# Patient Record
Sex: MALE | Race: WHITE | NOT HISPANIC OR LATINO | Employment: FULL TIME | URBAN - METROPOLITAN AREA
[De-identification: names, ages, dates, MRNs, and addresses within clinical notes are randomized per-mention and may not be internally consistent; named-entity substitution may affect disease eponyms.]

---

## 2023-08-07 DIAGNOSIS — I10 ESSENTIAL HYPERTENSION: ICD-10-CM

## 2023-08-07 DIAGNOSIS — E78.2 MIXED HYPERLIPIDEMIA: ICD-10-CM

## 2023-08-07 RX ORDER — ATENOLOL 50 MG/1
TABLET ORAL
Qty: 60 TABLET | Refills: 8 | OUTPATIENT
Start: 2023-08-07

## 2023-08-07 RX ORDER — ROSUVASTATIN CALCIUM 10 MG/1
TABLET, COATED ORAL
Qty: 30 TABLET | Refills: 8 | OUTPATIENT
Start: 2023-08-07

## 2023-08-25 DIAGNOSIS — I10 UNCONTROLLED HYPERTENSION: ICD-10-CM

## 2023-08-25 DIAGNOSIS — E78.2 MIXED HYPERLIPIDEMIA: ICD-10-CM

## 2023-08-25 DIAGNOSIS — I10 ESSENTIAL HYPERTENSION: ICD-10-CM

## 2023-08-28 RX ORDER — LOSARTAN POTASSIUM 50 MG/1
50 TABLET ORAL DAILY
Qty: 30 TABLET | Refills: 5 | OUTPATIENT
Start: 2023-08-28 | End: 2023-09-27

## 2023-08-28 RX ORDER — ATENOLOL 50 MG/1
TABLET ORAL
Qty: 60 TABLET | Refills: 8 | OUTPATIENT
Start: 2023-08-28

## 2023-08-28 RX ORDER — ROSUVASTATIN CALCIUM 10 MG/1
TABLET, COATED ORAL
Qty: 30 TABLET | Refills: 8 | OUTPATIENT
Start: 2023-08-28

## 2023-09-01 DIAGNOSIS — E78.2 MIXED HYPERLIPIDEMIA: ICD-10-CM

## 2023-09-01 RX ORDER — ROSUVASTATIN CALCIUM 10 MG/1
TABLET, COATED ORAL
Qty: 30 TABLET | Refills: 8 | OUTPATIENT
Start: 2023-09-01

## 2023-09-14 ENCOUNTER — OFFICE VISIT (OUTPATIENT)
Dept: FAMILY MEDICINE CLINIC | Facility: CLINIC | Age: 48
End: 2023-09-14
Payer: COMMERCIAL

## 2023-09-14 VITALS
DIASTOLIC BLOOD PRESSURE: 104 MMHG | RESPIRATION RATE: 20 BRPM | HEART RATE: 89 BPM | SYSTOLIC BLOOD PRESSURE: 180 MMHG | BODY MASS INDEX: 32.1 KG/M2 | HEIGHT: 72 IN | WEIGHT: 237 LBS | OXYGEN SATURATION: 97 %

## 2023-09-14 DIAGNOSIS — F41.9 ANXIETY: ICD-10-CM

## 2023-09-14 DIAGNOSIS — Z72.0 TOBACCO ABUSE: ICD-10-CM

## 2023-09-14 DIAGNOSIS — I10 ESSENTIAL HYPERTENSION: ICD-10-CM

## 2023-09-14 DIAGNOSIS — E55.9 VITAMIN D DEFICIENCY: ICD-10-CM

## 2023-09-14 DIAGNOSIS — Z11.4 SCREENING FOR HIV (HUMAN IMMUNODEFICIENCY VIRUS): ICD-10-CM

## 2023-09-14 DIAGNOSIS — E78.2 MIXED HYPERLIPIDEMIA: ICD-10-CM

## 2023-09-14 DIAGNOSIS — G43.009 MIGRAINE WITHOUT AURA AND WITHOUT STATUS MIGRAINOSUS, NOT INTRACTABLE: ICD-10-CM

## 2023-09-14 DIAGNOSIS — N18.30 STAGE 3 CHRONIC KIDNEY DISEASE, UNSPECIFIED WHETHER STAGE 3A OR 3B CKD (HCC): ICD-10-CM

## 2023-09-14 DIAGNOSIS — R94.31 ABNORMAL EKG: ICD-10-CM

## 2023-09-14 DIAGNOSIS — Z12.5 SCREENING FOR PROSTATE CANCER: ICD-10-CM

## 2023-09-14 DIAGNOSIS — F33.9 DEPRESSION, RECURRENT (HCC): ICD-10-CM

## 2023-09-14 DIAGNOSIS — Z11.59 NEED FOR HEPATITIS C SCREENING TEST: ICD-10-CM

## 2023-09-14 DIAGNOSIS — Z00.00 HEALTHCARE MAINTENANCE: ICD-10-CM

## 2023-09-14 DIAGNOSIS — I10 PRIMARY HYPERTENSION: Primary | ICD-10-CM

## 2023-09-14 DIAGNOSIS — R73.9 HYPERGLYCEMIA: ICD-10-CM

## 2023-09-14 DIAGNOSIS — I10 UNCONTROLLED HYPERTENSION: ICD-10-CM

## 2023-09-14 DIAGNOSIS — Z12.11 SCREENING FOR COLON CANCER: ICD-10-CM

## 2023-09-14 DIAGNOSIS — M10.9 GOUTY ARTHRITIS: ICD-10-CM

## 2023-09-14 PROBLEM — S59.902A INJURY OF ELBOW, LEFT: Status: RESOLVED | Noted: 2017-03-13 | Resolved: 2023-09-14

## 2023-09-14 PROBLEM — Z87.39 HX OF GOUT: Status: RESOLVED | Noted: 2020-09-24 | Resolved: 2023-09-14

## 2023-09-14 PROBLEM — F17.200 TOBACCO DEPENDENCE SYNDROME: Status: RESOLVED | Noted: 2018-07-11 | Resolved: 2023-09-14

## 2023-09-14 PROBLEM — S46.312A RUPTURE OF LEFT TRICEPS TENDON: Status: RESOLVED | Noted: 2018-07-11 | Resolved: 2023-09-14

## 2023-09-14 PROCEDURE — 99215 OFFICE O/P EST HI 40 MIN: CPT | Performed by: FAMILY MEDICINE

## 2023-09-14 PROCEDURE — 93000 ELECTROCARDIOGRAM COMPLETE: CPT | Performed by: FAMILY MEDICINE

## 2023-09-14 RX ORDER — SUMATRIPTAN 50 MG/1
TABLET, FILM COATED ORAL
Qty: 9 TABLET | Refills: 0 | Status: SHIPPED | OUTPATIENT
Start: 2023-09-14

## 2023-09-14 RX ORDER — AMLODIPINE BESYLATE 2.5 MG/1
2.5 TABLET ORAL DAILY
Qty: 30 TABLET | Refills: 5 | Status: SHIPPED | OUTPATIENT
Start: 2023-09-14

## 2023-09-14 RX ORDER — ATENOLOL 50 MG/1
50 TABLET ORAL 2 TIMES DAILY
Qty: 60 TABLET | Refills: 5 | Status: SHIPPED | OUTPATIENT
Start: 2023-09-14

## 2023-09-14 RX ORDER — LOSARTAN POTASSIUM 50 MG/1
50 TABLET ORAL DAILY
Qty: 30 TABLET | Refills: 5 | Status: SHIPPED | OUTPATIENT
Start: 2023-09-14 | End: 2023-10-14

## 2023-09-14 NOTE — PATIENT INSTRUCTIONS
Restart losartan, atenolol, and amlodipine  Use Imitrex immediately for headache may repeat in 2 hours no more than 2 tablets in 24 hours  Complete blood work as ordered  Complete stress echocardiogram  Recheck 2 weeks sooner if needed

## 2023-09-14 NOTE — ASSESSMENT & PLAN NOTE
Lab Results   Component Value Date    EGFR 50 02/16/2022    CREATININE 1.59 (H) 02/16/2022    CREATININE 0.93 06/05/2015   ARB therapy, blood work ordered

## 2023-09-14 NOTE — ASSESSMENT & PLAN NOTE
Uncontrolled patient has not taken his blood pressure medication a few weeks will restart medication check 2 weeks

## 2023-09-14 NOTE — PROGRESS NOTES
Name: Jhonny Haynes      : 1975      MRN: [id number]  Encounter Provider: Mikael Barlow DO  Encounter Date: 2023   Encounter department: [place] [address] [place] [facility]     1. Primary hypertension #2  uncontrolled hypertension  Medications renewed  EKG was completed  Blood work is ordered to include catecholamine  Of note patient did have renal artery Doppler which was normal 2022  3. CKD-3, blood work is ordered  4. For anxiety  5. MDD  Continue Lexapro  6. BMI 32.14, inaccurate diagnosis patient much more muscular than average male height weight and age  9. Former smoker, patient has quit as of , 15-pack-year history  8. Vitamin D deficiency, blood work ordered  5. Healthcare maintenance screening for hepatitis C and HIV discussed order placed  10. Screening colon cancer risk and benefit of colonoscopy versus Cologuard discussed. Patient chose Cologuard, ordered #11. Screening prostate cancer PSA is ordered  12. Gouty arthritis, asymptomatic uric acid is ordered  13. Abnormal EKG  Lateral ischemia versus LVH with strain  Patient did have a negative stress echo   Recheck stress echocardiogram  14. Recheck 2 weeks sooner if needed  15. Will have nursing check blood pressure in 1 week      1. Primary hypertension  Assessment & Plan:  Uncontrolled patient has not taken his blood pressure medication a few weeks will restart medication check 2 weeks    Orders:  -     POCT ECG  -     CBC  -     Comprehensive metabolic panel  -     Hemoglobin A1C; Future  -     Lipid Panel with Direct LDL reflex  -     TSH, 3rd generation with Free T4 reflex  -     PSA, Total Screen; Future  -     UA (URINE) with reflex to Scope  -     Vitamin D 25 hydroxy; Future  -     Catecholamines, fractionated, plasma; Future    2.  Stage 3 chronic kidney disease, unspecified whether stage 3a or 3b CKD ([address] [facility])  Assessment & Plan:  Lab Results   Component Value Date    EGFR 50 02/16/2022    CREATININE 1.59 (H) 02/16/2022    CREATININE 0.93 06/05/2015   ARB therapy, blood work ordered    Orders:  -     CBC  -     Comprehensive metabolic panel  -     Hemoglobin A1C; Future  -     Lipid Panel with Direct LDL reflex  -     TSH, 3rd generation with Free T4 reflex  -     PSA, Total Screen; Future  -     UA (URINE) with reflex to Scope  -     Vitamin D 25 hydroxy; Future  -     Catecholamines, fractionated, plasma; Future    3. Anxiety  Assessment & Plan:  Able continue Lexapro    Orders:  -     CBC  -     Comprehensive metabolic panel  -     Hemoglobin A1C; Future  -     Lipid Panel with Direct LDL reflex  -     TSH, 3rd generation with Free T4 reflex  -     PSA, Total Screen; Future  -     UA (URINE) with reflex to Scope  -     Vitamin D 25 hydroxy; Future  -     Catecholamines, fractionated, plasma; Future    4. Depression, recurrent ([address])  Assessment & Plan:  Stable continue Lexapro    Orders:  -     CBC  -     Comprehensive metabolic panel  -     Hemoglobin A1C; Future  -     Lipid Panel with Direct LDL reflex  -     TSH, 3rd generation with Free T4 reflex  -     PSA, Total Screen; Future  -     UA (URINE) with reflex to Scope  -     Vitamin D 25 hydroxy; Future  -     Catecholamines, fractionated, plasma; Future    5. Mixed hyperlipidemia  Assessment & Plan:  Blood work ordered    Orders:  -     CBC  -     Comprehensive metabolic panel  -     Hemoglobin A1C; Future  -     Lipid Panel with Direct LDL reflex  -     TSH, 3rd generation with Free T4 reflex  -     PSA, Total Screen; Future  -     UA (URINE) with reflex to Scope  -     Vitamin D 25 hydroxy; Future  -     Catecholamines, fractionated, plasma; Future    6. Tobacco abuse  Assessment & Plan:  15-pack-year history quit 4/21    Orders:  -     CBC  -     Comprehensive metabolic panel  -     Hemoglobin A1C; Future  -     Lipid Panel with Direct LDL reflex  -     TSH, 3rd generation with Free T4 reflex  -     PSA, Total Screen;  Future  - UA (URINE) with reflex to Scope  -     Vitamin D 25 hydroxy; Future  -     Catecholamines, fractionated, plasma; Future    7. Vitamin D deficiency  Assessment & Plan:  Blood work ordered    Orders:  -     CBC  -     Comprehensive metabolic panel  -     Hemoglobin A1C; Future  -     Lipid Panel with Direct LDL reflex  -     TSH, 3rd generation with Free T4 reflex  -     PSA, Total Screen; Future  -     UA (URINE) with reflex to Scope  -     Vitamin D 25 hydroxy; Future  -     Catecholamines, fractionated, plasma; Future    8. Healthcare maintenance  Assessment & Plan:  Screening for HIV and hepatitis C discussed order placed    Orders:  -     CBC  -     Comprehensive metabolic panel  -     Hemoglobin A1C; Future  -     Lipid Panel with Direct LDL reflex  -     TSH, 3rd generation with Free T4 reflex  -     PSA, Total Screen; Future  -     UA (URINE) with reflex to Scope  -     Vitamin D 25 hydroxy; Future  -     Catecholamines, fractionated, plasma; Future    9. Screening for colon cancer  Assessment & Plan:  Cologuard ordered    Orders:  -     Cologuard  -     CBC  -     Comprehensive metabolic panel  -     Hemoglobin A1C; Future  -     Lipid Panel with Direct LDL reflex  -     TSH, 3rd generation with Free T4 reflex  -     PSA, Total Screen; Future  -     UA (URINE) with reflex to Scope  -     Vitamin D 25 hydroxy; Future  -     Catecholamines, fractionated, plasma; Future    10. Screening for prostate cancer  Assessment & Plan:  PSA ordered    Orders:  -     CBC  -     Comprehensive metabolic panel  -     Hemoglobin A1C; Future  -     Lipid Panel with Direct LDL reflex  -     TSH, 3rd generation with Free T4 reflex  -     PSA, Total Screen; Future  -     UA (URINE) with reflex to Scope  -     Vitamin D 25 hydroxy; Future  -     Catecholamines, fractionated, plasma; Future    11.  Hyperglycemia  Assessment & Plan:  Blood work ordered    Orders:  -     CBC  -     Comprehensive metabolic panel  -     Hemoglobin A1C; Future  -     Lipid Panel with Direct LDL reflex  -     TSH, 3rd generation with Free T4 reflex  -     PSA, Total Screen; Future  -     UA (URINE) with reflex to Scope  -     Vitamin D 25 hydroxy; Future  -     Catecholamines, fractionated, plasma; Future    12. Abnormal EKG  Assessment & Plan:  Stress echo ordered LVH with strain versus ischemia    Orders:  -     Echo stress test, exercise; Future; Expected date: 09/14/2023  -     CBC  -     Comprehensive metabolic panel  -     Hemoglobin A1C; Future  -     Lipid Panel with Direct LDL reflex  -     TSH, 3rd generation with Free T4 reflex  -     PSA, Total Screen; Future  -     UA (URINE) with reflex to Scope  -     Vitamin D 25 hydroxy; Future  -     Catecholamines, fractionated, plasma; Future    13. Gouty arthritis  -     CBC  -     Comprehensive metabolic panel  -     Hemoglobin A1C; Future  -     Lipid Panel with Direct LDL reflex  -     TSH, 3rd generation with Free T4 reflex  -     PSA, Total Screen; Future  -     UA (URINE) with reflex to Scope  -     Vitamin D 25 hydroxy; Future  -     Catecholamines, fractionated, plasma; Future  -     Uric acid    14. Migraine without aura and without status migrainosus, not intractable  Assessment & Plan:  Imitrex ordered    Orders:  -     SUMAtriptan (Imitrex) 50 mg tablet; 1 tablet stat for headache. May repeat 1 tablet in 2 hours. No more than 2 to 24 hours  -     CBC  -     Comprehensive metabolic panel  -     Hemoglobin A1C; Future  -     Lipid Panel with Direct LDL reflex  -     TSH, 3rd generation with Free T4 reflex  -     PSA, Total Screen; Future  -     UA (URINE) with reflex to Scope  -     Vitamin D 25 hydroxy; Future  -     Catecholamines, fractionated, plasma; Future    15. Uncontrolled hypertension  -     losartan (COZAAR) 50 mg tablet;  Take 1 tablet (50 mg total) by mouth daily  -     CBC  -     Comprehensive metabolic panel  -     Hemoglobin A1C; Future  -     Lipid Panel with Direct LDL reflex  - TSH, 3rd generation with Free T4 reflex  -     PSA, Total Screen; Future  -     UA (URINE) with reflex to Scope  -     Vitamin D 25 hydroxy; Future  -     Catecholamines, fractionated, plasma; Future    16. Essential hypertension  -     atenolol (TENORMIN) 50 mg tablet; Take 1 tablet (50 mg total) by mouth 2 (two) times a day  -     amLODIPine (NORVASC) 2.5 mg tablet; Take 1 tablet (2.5 mg total) by mouth daily  -     CBC  -     Comprehensive metabolic panel  -     Hemoglobin A1C; Future  -     Lipid Panel with Direct LDL reflex  -     TSH, 3rd generation with Free T4 reflex  -     PSA, Total Screen; Future  -     UA (URINE) with reflex to Scope  -     Vitamin D 25 hydroxy; Future  -     Catecholamines, fractionated, plasma; Future    17. Need for hepatitis C screening test  -     Hepatitis C Antibody; Future  -     CBC  -     Comprehensive metabolic panel  -     Hemoglobin A1C; Future  -     Lipid Panel with Direct LDL reflex  -     TSH, 3rd generation with Free T4 reflex  -     PSA, Total Screen; Future  -     UA (URINE) with reflex to Scope  -     Vitamin D 25 hydroxy; Future  -     Catecholamines, fractionated, plasma; Future    18. Screening for HIV (human immunodeficiency virus)  -     HIV 1/2 AG/AB w Reflex [facility] for 2 yr old and above; Future  -     CBC  -     Comprehensive metabolic panel  -     Hemoglobin A1C; Future  -     Lipid Panel with Direct LDL reflex  -     TSH, 3rd generation with Free T4 reflex  -     PSA, Total Screen; Future  -     UA (URINE) with reflex to Scope  -     Vitamin D 25 hydroxy; Future  -     Catecholamines, fractionated, plasma; Future      BMI Counseling: Body mass index is 32.14 kg/m². The BMI is above normal. Nutrition recommendations include moderation in carbohydrate intake and reducing intake of cholesterol. Exercise recommendations include exercising 3-5 times per week. Rationale for BMI follow-up plan is due to patient being overweight or obese.          Subjective Has not seen a doctor in almost a year. He has run out of medications for his blood pressure couple weeks ago. Denies chest pain shortness of breath but is having headaches migraine type. Has been going on for many years with photo and phonophobia. Negative vertigo negative syncope    Review of Systems   Constitutional: Negative. HENT: Negative. Eyes: Negative. Respiratory:        HPI   Cardiovascular:        HPI   Gastrointestinal:        Patient did not have any colon cancer screening as of yet   Endocrine: Negative. Genitourinary: Negative. Musculoskeletal: Negative. Skin: Negative. Allergic/Immunologic: Negative. Neurological:        HPI   Hematological: Negative. Psychiatric/Behavioral: Negative. Current Outpatient Medications on File Prior to Visit   Medication Sig   • ALPRAZolam (XANAX) 0.5 mg tablet Take 1 tablet (0.5 mg total) by mouth 2 (two) times a day as needed for anxiety   • atorvastatin (LIPITOR) 40 mg tablet Take 40 mg by mouth in the morning. • escitalopram (LEXAPRO) 20 mg tablet Take 20 mg by mouth in the morning. • finasteride (PROSCAR) 5 mg tablet take 1 tablet by mouth once daily   • rosuvastatin (CRESTOR) 10 MG tablet TAKE 1 TABLET BY MOUTH EVERY DAY   • zolpidem (AMBIEN) 10 mg tablet Take 1 tablet (10 mg total) by mouth daily at bedtime as needed for sleep   • [DISCONTINUED] amLODIPine (NORVASC) 2.5 mg tablet take 1 tablet by mouth once daily   • [DISCONTINUED] atenolol (TENORMIN) 50 mg tablet TAKE 1 TABLET BY MOUTH TWICE A DAY   • [DISCONTINUED] losartan (COZAAR) 50 mg tablet TAKE 1 TABLET (50 MG TOTAL) BY MOUTH IN THE MORNING. • [DISCONTINUED] Acetaminophen Extra Strength 500 MG tablet TAKE 2 TABLETS BY MOUTH 3 TIMES A DAY FOR 7 DAYS.  THEN AS NEEDED (Patient not taking: No sig reported)   • [DISCONTINUED] celecoxib (CeleBREX) 200 mg capsule Take 200 mg by mouth 2 (two) times a day (Patient not taking: Reported on 5/17/2022)   • [DISCONTINUED] ergocalciferol (VITAMIN D2) 50,000 units Take 1 capsule (50,000 Units total) by mouth once a week (Patient not taking: Reported on 9/14/2023)   • [DISCONTINUED] methocarbamol (ROBAXIN) 750 mg tablet TAKE 1 TABLET BY MOUTH 4 TIMES A DAY AS NEEDED FOR MUSCLE SPASMS (Patient not taking: No sig reported)   • [DISCONTINUED] nicotine (NICODERM CQ) 14 mg/24hr TD 24 hr patch Place 1 patch on the skin every 24 hours Patient was start after 21 mg patches are finished (Patient not taking: Reported on 9/14/2023)   • [DISCONTINUED] nicotine (NICODERM CQ) 21 mg/24 hr TD 24 hr patch Place 1 patch on the skin every 24 hours (Patient not taking: Reported on 9/14/2023)   • [DISCONTINUED] nicotine (NICODERM CQ) 7 mg/24hr TD 24 hr patch Place 1 patch on the skin every 24 hours Start after 14 mg patches of finished (Patient not taking: Reported on 9/14/2023)   • [DISCONTINUED] Sod Fluoride-Potassium Nitrate (PreviDent 5000 Sensitive) 1.1-5 % PSTE PreviDent 5000 Sensitive 1.1 %-5 % dental paste   BRUSH ONCE DAILY BEFORE BED (Patient not taking: No sig reported)   • [DISCONTINUED] valACYclovir (VALTREX) 500 mg tablet Take 1 tablet (500 mg total) by mouth 2 (two) times a day for 5 days (Patient not taking: Reported on 9/14/2023)       Objective     BP (!) 180/104   Pulse 89   Resp 20   Ht 6' (1.829 m)   Wt 108 kg (237 lb)   SpO2 97%   BMI 32.14 kg/m²     Physical Exam  Vitals and nursing note reviewed. Constitutional:       General: He is not in acute distress. Appearance: Normal appearance. He is not ill-appearing, toxic-appearing or diaphoretic. Comments: Very muscular 17-year-old white male in no acute distress   HENT:      Head: Normocephalic and atraumatic. Right Ear: Tympanic membrane normal.      Left Ear: Tympanic membrane normal.      Nose: Nose normal.      Mouth/Throat:      Mouth: Mucous membranes are moist.      Pharynx: Oropharynx is clear. No oropharyngeal exudate or posterior oropharyngeal erythema. Eyes:      General: No scleral icterus. Right eye: No discharge. Left eye: No discharge. Extraocular Movements: Extraocular movements intact. Conjunctiva/sclera: Conjunctivae normal.      Pupils: Pupils are equal, round, and reactive to light. Neck:      Vascular: No carotid bruit. Cardiovascular:      Rate and Rhythm: Normal rate and regular rhythm. Heart sounds: Normal heart sounds. Pulmonary:      Effort: Pulmonary effort is normal.      Breath sounds: Normal breath sounds. Abdominal:      General: Bowel sounds are normal.      Palpations: Abdomen is soft. Tenderness: There is no abdominal tenderness. Musculoskeletal:      Cervical back: Neck supple. Right lower leg: No edema. Left lower leg: No edema. Skin:     General: Skin is warm and dry. Neurological:      General: No focal deficit present. Mental Status: He is alert and oriented to person, place, and time. Cranial Nerves: No cranial nerve deficit.       Gait: Gait normal.   Psychiatric:         Mood and Affect: Mood normal.       Gabriel Hanley DO

## 2023-09-20 DIAGNOSIS — G47.00 INSOMNIA, UNSPECIFIED TYPE: ICD-10-CM

## 2023-09-20 NOTE — TELEPHONE ENCOUNTER
Adrien, this is Sohan Caballero 5th calling and I'm calling. In regards to my fiance Williams Pill Date of birth is [birth year]. We were just in to see Doctor Bonnie Whitfield last week and he prescribed some medication and we did ask if he could prescribe Ambien and that was not sent over to [facility] on [facility]. Is there any way that you could again fill the prescription Ambien and send it over to [facility]? Please call me back at [phone] if you need any more information or have any questions. Thank you.

## 2023-09-21 RX ORDER — ZOLPIDEM TARTRATE 10 MG/1
10 TABLET ORAL
Qty: 30 TABLET | Refills: 2 | Status: SHIPPED | OUTPATIENT
Start: 2023-09-21

## 2023-10-05 ENCOUNTER — RA CDI HCC (OUTPATIENT)
Dept: OTHER | Facility: HOSPITAL | Age: 48
End: 2023-10-05

## 2023-10-05 NOTE — PROGRESS NOTES
[facility] [address] [facility] coding opportunities       Chart reviewed, no opportunity found: CHART REVIEWED, NO OPPORTUNITY FOUND        Patients Insurance        Commercial Insurance: Saeid Campbell

## 2023-10-12 ENCOUNTER — TELEPHONE (OUTPATIENT)
Dept: FAMILY MEDICINE CLINIC | Facility: CLINIC | Age: 48
End: 2023-10-12

## 2023-10-12 DIAGNOSIS — F33.9 DEPRESSION, RECURRENT (HCC): ICD-10-CM

## 2023-10-12 DIAGNOSIS — F41.9 ANXIETY: Primary | ICD-10-CM

## 2023-10-12 RX ORDER — ESCITALOPRAM OXALATE 20 MG/1
20 TABLET ORAL DAILY
Qty: 30 TABLET | Refills: 3 | Status: SHIPPED | OUTPATIENT
Start: 2023-10-12

## 2023-10-12 NOTE — TELEPHONE ENCOUNTER
Medication: lexapro 20 mg  Day supply:30  Pharmacy: [facility] [address]     Last office visit: 9/20/2023    Upcoming office visit: 11/16/2023

## 2023-11-05 LAB
25(OH)D3 SERPL-MCNC: 29 NG/ML (ref 30–100)
ALBUMIN SERPL-MCNC: 4.4 G/DL (ref 3.6–5.1)
ALBUMIN/GLOB SERPL: 1.4 (CALC) (ref 1–2.5)
ALP SERPL-CCNC: 143 U/L (ref 35–144)
ALT SERPL-CCNC: 28 U/L (ref 9–46)
APPEARANCE UR: CLEAR
AST SERPL-CCNC: 32 U/L (ref 10–35)
BACTERIA UR QL AUTO: ABNORMAL /HPF
BASOPHILS # BLD AUTO: 28 CELLS/UL (ref 0–200)
BASOPHILS NFR BLD AUTO: 0.4 %
BILIRUB SERPL-MCNC: 0.4 MG/DL (ref 0.2–1.2)
BILIRUB UR QL STRIP: NEGATIVE
BUN SERPL-MCNC: 17 MG/DL (ref 7–25)
BUN/CREAT SERPL: ABNORMAL (CALC) (ref 6–22)
CALCIUM SERPL-MCNC: 9.5 MG/DL (ref 8.6–10.3)
CHLORIDE SERPL-SCNC: 104 MMOL/L (ref 98–110)
CHOLEST SERPL-MCNC: 242 MG/DL
CHOLEST/HDLC SERPL: 4.7 (CALC)
CO2 SERPL-SCNC: 28 MMOL/L (ref 20–32)
COLOR UR: YELLOW
CREAT SERPL-MCNC: 1.07 MG/DL (ref 0.7–1.3)
EOSINOPHIL # BLD AUTO: 133 CELLS/UL (ref 15–500)
EOSINOPHIL NFR BLD AUTO: 1.9 %
ERYTHROCYTE [DISTWIDTH] IN BLOOD BY AUTOMATED COUNT: 13.2 % (ref 11–15)
GFR/BSA.PRED SERPLBLD CYS-BASED-ARV: 85 ML/MIN/1.73M2
GLOBULIN SER CALC-MCNC: 3.1 G/DL (CALC) (ref 1.9–3.7)
GLUCOSE SERPL-MCNC: 108 MG/DL (ref 65–99)
GLUCOSE UR QL STRIP: NEGATIVE
HBA1C MFR BLD: 5.5 % OF TOTAL HGB
HCT VFR BLD AUTO: 42.6 % (ref 38.5–50)
HCV AB SERPL QL IA: NORMAL
HDLC SERPL-MCNC: 52 MG/DL
HGB BLD-MCNC: 14.4 G/DL (ref 13.2–17.1)
HGB UR QL STRIP: NEGATIVE
HIV 1+2 AB+HIV1 P24 AG SERPL QL IA: NORMAL
HYALINE CASTS #/AREA URNS LPF: ABNORMAL /LPF
KETONES UR QL STRIP: NEGATIVE
LDLC SERPL CALC-MCNC: 153 MG/DL (CALC)
LEUKOCYTE ESTERASE UR QL STRIP: NEGATIVE
LYMPHOCYTES # BLD AUTO: 2163 CELLS/UL (ref 850–3900)
LYMPHOCYTES NFR BLD AUTO: 30.9 %
MCH RBC QN AUTO: 28.9 PG (ref 27–33)
MCHC RBC AUTO-ENTMCNC: 33.8 G/DL (ref 32–36)
MCV RBC AUTO: 85.4 FL (ref 80–100)
MONOCYTES # BLD AUTO: 525 CELLS/UL (ref 200–950)
MONOCYTES NFR BLD AUTO: 7.5 %
NEUTROPHILS # BLD AUTO: 4151 CELLS/UL (ref 1500–7800)
NEUTROPHILS NFR BLD AUTO: 59.3 %
NITRITE UR QL STRIP: NEGATIVE
NONHDLC SERPL-MCNC: 190 MG/DL (CALC)
PH UR STRIP: 6.5 [PH] (ref 5–8)
PLATELET # BLD AUTO: 302 THOUSAND/UL (ref 140–400)
PMV BLD REES-ECKER: 10 FL (ref 7.5–12.5)
POTASSIUM SERPL-SCNC: 4.5 MMOL/L (ref 3.5–5.3)
PROT SERPL-MCNC: 7.5 G/DL (ref 6.1–8.1)
PROT UR QL STRIP: ABNORMAL
PSA SERPL-MCNC: 0.31 NG/ML
RBC # BLD AUTO: 4.99 MILLION/UL (ref 4.2–5.8)
RBC #/AREA URNS HPF: ABNORMAL /HPF
SODIUM SERPL-SCNC: 140 MMOL/L (ref 135–146)
SP GR UR STRIP: 1.02 (ref 1–1.03)
SQUAMOUS #/AREA URNS HPF: ABNORMAL /HPF
TRIGL SERPL-MCNC: 207 MG/DL
TSH SERPL-ACNC: 1.12 MIU/L (ref 0.4–4.5)
URATE SERPL-MCNC: 7.9 MG/DL (ref 4–8)
WBC # BLD AUTO: 7 THOUSAND/UL (ref 3.8–10.8)
WBC #/AREA URNS HPF: ABNORMAL /HPF

## 2023-11-06 ENCOUNTER — HOSPITAL ENCOUNTER (OUTPATIENT)
Dept: NON INVASIVE DIAGNOSTICS | Facility: HOSPITAL | Age: 48
Discharge: HOME | End: 2023-11-06
Payer: COMMERCIAL

## 2023-11-06 VITALS — WEIGHT: 237 LBS | HEIGHT: 72 IN | BODY MASS INDEX: 32.1 KG/M2

## 2023-11-06 DIAGNOSIS — R94.31 ABNORMAL EKG: ICD-10-CM

## 2023-11-06 LAB
AORTIC ROOT: 3.4 CM
AORTIC VALVE MEAN VELOCITY: 12.2 M/S
APICAL FOUR CHAMBER EJECTION FRACTION: 69 %
ASCENDING AORTA: 4.6 CM
AV AREA BY CONTINUOUS VTI: 3.4 CM2
AV AREA PEAK VELOCITY: 3.4 CM2
AV LVOT MEAN GRADIENT: 6 MMHG
AV LVOT PEAK GRADIENT: 8 MMHG
AV MEAN GRADIENT: 7 MMHG
AV PEAK GRADIENT: 10 MMHG
AV VALVE AREA: 3.37 CM2
AV VELOCITY RATIO: 0.9
DOP CALC AO PEAK VEL: 1.59 M/S
DOP CALC AO VTI: 33.1 CM
DOP CALC LVOT AREA: 3.8 CM2
DOP CALC LVOT CARDIAC INDEX: 3.88 L/MIN/M2
DOP CALC LVOT CARDIAC OUTPUT: 8.88 L/MIN
DOP CALC LVOT DIAMETER: 2.2 CM
DOP CALC LVOT PEAK VEL VTI: 29.39 CM
DOP CALC LVOT PEAK VEL: 1.43 M/S
DOP CALC LVOT STROKE INDEX: 48.9 ML/M2
DOP CALC LVOT STROKE VOLUME: 111.66
E WAVE DECELERATION TIME: 238 MS
E/A RATIO: 0.62
LAAS-AP4: 19.6 CM2
MV E'TISSUE VEL-LAT: 9 CM/S
MV E'TISSUE VEL-SEP: 8 CM/S
MV PEAK A VEL: 0.91 M/S
MV PEAK E VEL: 56 CM/S
MV STENOSIS PRESSURE HALF TIME: 69 MS
MV VALVE AREA P 1/2 METHOD: 3.19
RIGHT ATRIAL 2D VOLUME: 46 ML
RIGHT ATRIUM AREA SYSTOLE A4C: 17.5 CM2
RIGHT VENTRICLE ID DIMENSION: 3 CM
SL CV LV EF: 69
TRICUSPID ANNULAR PLANE SYSTOLIC EXCURSION: 2.2 CM

## 2023-11-06 PROCEDURE — 93350 STRESS TTE ONLY: CPT

## 2023-11-06 PROCEDURE — 93350 STRESS TTE ONLY: CPT | Performed by: STUDENT IN AN ORGANIZED HEALTH CARE EDUCATION/TRAINING PROGRAM

## 2023-11-06 NOTE — RESULT ENCOUNTER NOTE
Tests were not normal, and should follow at  your scheduled Office visit. Please call about this, if [employer] message is not available or not read by patient.

## 2023-11-06 NOTE — LETTER
[address] [place] [address]  Cardiology Department  [address] [place] [id number]    November 7, 2023    MRN: [id number]     Phone: [phone]     Dear Mr. Ray Bolivar recently had a(n) Stress Test performed on 11/6/2023 at  [address] [place] [address] that was requested by Judy Rico DO. The study was reviewed by a cardiologist, which is a physician who specializes in testing involving the heart. The cardiologist issued a report describing his or her findings. In that report there was a finding that the cardiologists felt warranted further discussion with your health care provider and that discussion would be beneficial to you. The results were sent to Judy Rico DO on 11/06/2023  3:49 PM. We recommend that you contact Judy Rico DO at [phone] or set up an appointment to discuss the results of your cardiac test. If you have already heard from Judy Rico DO regarding the results of your study, you can disregard this letter. This letter is not meant to alarm you, but intended to encourage you to follow-up on your test results with the provider that sent you. In addition, we have enclosed answers to frequently asked questions by other patients who have also received a letter to review results with their health care provider (see page two). Thank you for choosing [address] [place] [address] for your cardiac testing needs. Sincerely,    [address] [place] [address]  Cardiology Department                                                                                                                                                                        FREQUENTLY ASKED QUESTIONS    Why am I receiving this letter? [facility] requires us to notify patients who have findings on imaging exams that may require more testing or follow-up with a health professional within the next 3 months.         How serious is the finding on the imaging test?  This letter is sent to all patients who need follow-up or more testing within 3 months. Receiving this letter does not necessarily mean you have a life-threatening finding or disease. Recommendations in the cardiologist report are general in nature and it is up to your healthcare provider to say whether those recommendations make sense for your situation. You are strongly encouraged to talk to your healthcare provider about the results and ask whether additional steps need to be taken. Where can I get a copy of the final report for my recent cardiology exam?  To get a full copy of the report you can access your records online at [email or web address] or please contact [facility] Medical Records Department at [phone] Monday through Friday between 8 am and 6 pm.         What do I need to do now? Please contact your health care provider who requested the test to discuss what further actions (if any) are needed. You may have already heard from your ordering physician in regards to this test in which case you can disregard this letter. NOTICE IN ACCORDANCE WITH THE [place] “PATIENT TEST RESULT INFORMATION ACT OF 2018”    You are receiving this notice as a result of a determination by your diagnostic study that further discussions of your test results are warranted and would be beneficial to you. The complete results of your test or tests have been or will be sent to the health care practitioner that ordered the test or tests. It is recommended that you contact your health care practitioner to discuss your results as soon as possible.

## 2023-11-07 ENCOUNTER — TELEPHONE (OUTPATIENT)
Dept: FAMILY MEDICINE CLINIC | Facility: CLINIC | Age: 48
End: 2023-11-07

## 2023-11-07 LAB
MAX DIASTOLIC BP: 140 MMHG
MAX PREDICTED HEART RATE: 170 BPM
PROTOCOL NAME: NORMAL
REASON FOR TERMINATION: NORMAL
STRESS POST EXERCISE DUR MIN: 0 MIN
STRESS POST EXERCISE DUR SEC: 0 SEC
STRESS POST PEAK HR: 96 BPM
STRESS POST PEAK SYSTOLIC BP: 218 MMHG
TARGET HR FORMULA: NORMAL
TEST INDICATION: NORMAL

## 2023-11-07 NOTE — TELEPHONE ENCOUNTER
----- Message from Jerardo Ferguson MD sent at 11/6/2023  4:43 PM EST -----  Tests were not normal, and should follow at  your scheduled Office visit. Please call about this, if [employer] message is not available or not read by patient.

## 2023-11-13 PROBLEM — Z12.5 SCREENING FOR PROSTATE CANCER: Status: RESOLVED | Noted: 2023-09-14 | Resolved: 2023-11-13

## 2023-11-13 PROBLEM — Z12.11 SCREENING FOR COLON CANCER: Status: RESOLVED | Noted: 2023-09-14 | Resolved: 2023-11-13

## 2023-11-13 PROBLEM — Z00.00 HEALTHCARE MAINTENANCE: Status: RESOLVED | Noted: 2023-09-14 | Resolved: 2023-11-13

## 2023-11-14 DIAGNOSIS — F33.9 DEPRESSION, RECURRENT (HCC): ICD-10-CM

## 2023-11-14 RX ORDER — ESCITALOPRAM OXALATE 20 MG/1
20 TABLET ORAL DAILY
Qty: 90 TABLET | Refills: 2 | Status: SHIPPED | OUTPATIENT
Start: 2023-11-14

## 2023-11-15 PROBLEM — I51.7 LEFT VENTRICULAR HYPERTROPHY: Status: ACTIVE | Noted: 2023-11-15

## 2023-11-15 PROBLEM — I77.810 ASCENDING AORTA DILATION (HCC): Status: ACTIVE | Noted: 2023-11-15

## 2023-11-16 ENCOUNTER — OFFICE VISIT (OUTPATIENT)
Dept: FAMILY MEDICINE CLINIC | Facility: CLINIC | Age: 48
End: 2023-11-16
Payer: COMMERCIAL

## 2023-11-16 VITALS
SYSTOLIC BLOOD PRESSURE: 146 MMHG | OXYGEN SATURATION: 98 % | BODY MASS INDEX: 31.77 KG/M2 | HEIGHT: 72 IN | HEART RATE: 75 BPM | WEIGHT: 234.6 LBS | DIASTOLIC BLOOD PRESSURE: 84 MMHG

## 2023-11-16 DIAGNOSIS — G43.009 MIGRAINE WITHOUT AURA AND WITHOUT STATUS MIGRAINOSUS, NOT INTRACTABLE: ICD-10-CM

## 2023-11-16 DIAGNOSIS — E78.2 MIXED HYPERLIPIDEMIA: ICD-10-CM

## 2023-11-16 DIAGNOSIS — F33.9 DEPRESSION, RECURRENT (HCC): ICD-10-CM

## 2023-11-16 DIAGNOSIS — I10 PRIMARY HYPERTENSION: ICD-10-CM

## 2023-11-16 DIAGNOSIS — I10 ACCELERATED HYPERTENSION: ICD-10-CM

## 2023-11-16 DIAGNOSIS — N18.30 STAGE 3 CHRONIC KIDNEY DISEASE, UNSPECIFIED WHETHER STAGE 3A OR 3B CKD (HCC): ICD-10-CM

## 2023-11-16 DIAGNOSIS — R94.31 ABNORMAL EKG: ICD-10-CM

## 2023-11-16 DIAGNOSIS — Z13.31 POSITIVE DEPRESSION SCREENING: ICD-10-CM

## 2023-11-16 DIAGNOSIS — I77.810 ASCENDING AORTA DILATION (HCC): Primary | ICD-10-CM

## 2023-11-16 DIAGNOSIS — M10.9 GOUTY ARTHRITIS: ICD-10-CM

## 2023-11-16 DIAGNOSIS — E55.9 VITAMIN D DEFICIENCY: ICD-10-CM

## 2023-11-16 DIAGNOSIS — R73.9 HYPERGLYCEMIA: ICD-10-CM

## 2023-11-16 DIAGNOSIS — I51.7 LEFT VENTRICULAR HYPERTROPHY: ICD-10-CM

## 2023-11-16 PROCEDURE — 99215 OFFICE O/P EST HI 40 MIN: CPT | Performed by: FAMILY MEDICINE

## 2023-11-16 RX ORDER — ROSUVASTATIN CALCIUM 5 MG/1
5 TABLET, COATED ORAL DAILY
Qty: 30 TABLET | Refills: 5 | Status: SHIPPED | OUTPATIENT
Start: 2023-11-16

## 2023-11-16 RX ORDER — AMLODIPINE AND VALSARTAN 5; 160 MG/1; MG/1
1 TABLET ORAL DAILY
Qty: 30 TABLET | Refills: 5 | Status: SHIPPED | OUTPATIENT
Start: 2023-11-16

## 2023-11-16 RX ORDER — ATENOLOL 100 MG/1
100 TABLET ORAL DAILY
Qty: 30 TABLET | Refills: 5 | Status: SHIPPED | OUTPATIENT
Start: 2023-11-16

## 2023-11-16 NOTE — ASSESSMENT & PLAN NOTE
Discontinue losartan//amitriptyline  Change to Exforge, amlodipine 5 mg/valsartan 160 mg  Change atenolol from 50 twice a day to 100 mg once daily  Catecholamines still pending

## 2023-11-16 NOTE — PATIENT INSTRUCTIONS
Discontinue losartan and amlodipine  Start Exforge, valsartan/amlodipine, 5/160, 1 daily for blood pressure  Discontinue atenolol 50 mg twice daily  Start atenolol 100 mg once daily  Discontinue all previous cholesterol medication  Start Crestor/rosuvastatin 5 mg daily  Follow-up with cardiology  Complete CT of chest  Recheck 6 weeks sooner if needed

## 2023-11-16 NOTE — PROGRESS NOTES
Name: Gina Arredondo      : 1975      MRN: [id number]  Encounter Provider: Solomon Santizo DO  Encounter Date: 2023   Encounter department: [place] [address]     1. Ascending aortic dilation  Check CT chest  Refer to cardiology #2. MDD/a mildly positive pression screen  We will monitor continue Lexapro 20 mg daily  3. Hypertension/accelerated hypertension  We will change to Exforge, amlodipine 5/valsartan 160  Change atenolol 50 twice daily to 100 mg daily  Discontinue plain amlodipine and losartan  Refer to cardiology  Serum catecholamines still pending  4. Abnormal EKG, refer to cardiology  5. LVH refer to cardiology  6. Hyperglycemia diet controlled  7. Vitamin D borderline we will monitor  8. Gouty arthritis uric acid normal  9. CKD-3, resolved GFR is 85  To 10. Mixed hyperlipidemia start Crestor 5 mg daily  11. Migraine, stable on beta-blocker  12. Recheck 6 weeks  1. Ascending aorta dilation (HCC)  -     CT chest wo contrast; Future; Expected date: 2023  -     Ambulatory Referral to Cardiology; Future    2. Positive depression screening    3. Depression, recurrent ([address])  Assessment & Plan:  Mildly positive depression screen we will continue Lexapro 20 mg and monitor      4. Primary hypertension  -     amLODIPine-valsartan (EXFORGE) 5-160 MG per tablet; Take 1 tablet by mouth daily  -     atenolol (TENORMIN) 100 mg tablet; Take 1 tablet (100 mg total) by mouth daily  -     Ambulatory Referral to Cardiology; Future    5. Accelerated hypertension  Assessment & Plan:  Discontinue losartan//amitriptyline  Change to Exforge, amlodipine 5 mg/valsartan 160 mg  Change atenolol from 50 twice a day to 100 mg once daily  Catecholamines still pending    Orders:  -     Ambulatory Referral to Cardiology; Future    6. Left ventricular hypertrophy  -     Ambulatory Referral to Cardiology; Future    7.  Hyperglycemia  Assessment & Plan:  Diet controlled      8. Vitamin D deficiency  Assessment & Plan:  Continue to monitor patient's vitamin D levels 29      9. Gouty arthritis  Assessment & Plan:  Uric acid is normal      10. Abnormal EKG  Assessment & Plan:  Unable to complete stress echocardiogram refer to cardiology    Orders:  -     Ambulatory Referral to Cardiology; Future    11. Migraine without aura and without status migrainosus, not intractable  Assessment & Plan:  Stable on beta-blocker    Orders:  -     atenolol (TENORMIN) 100 mg tablet; Take 1 tablet (100 mg total) by mouth daily    12. Stage 3 chronic kidney disease, unspecified whether stage 3a or 3b CKD ([address])  Assessment & Plan:  Lab Results   Component Value Date    EGFR 85 11/04/2023    EGFR 50 02/16/2022    CREATININE 1.07 11/04/2023    CREATININE 1.59 (H) 02/16/2022    CREATININE 0.93 06/05/2015   This is resolved GFR is 85    Orders:  -     Comprehensive metabolic panel; Future; Expected date: 12/18/2023  -     LDL cholesterol, direct; Future; Expected date: 12/18/2023    13. Mixed hyperlipidemia  Assessment & Plan: We will start Crestor 5 mg daily    Orders:  -     rosuvastatin (CRESTOR) 5 mg tablet; Take 1 tablet (5 mg total) by mouth daily  -     Comprehensive metabolic panel; Future; Expected date: 12/18/2023  -     LDL cholesterol, direct; Future; Expected date: 12/18/2023           Subjective      Patient went for stress test and it was canceled because his blood pressure was 218/140. Patient did have an echocardiogram which did show LVH and possible thoracic aneurysm, will refer to cardiology and check CT of chest.  Blood work was discussed with the patient and his wife      Review of Systems   Constitutional: Negative. HENT: Negative. Eyes: Negative. Respiratory: Negative. Cardiovascular:         HPI   Gastrointestinal: Negative. Endocrine: Negative. Genitourinary: Negative. Musculoskeletal: Negative. Skin: Negative. Allergic/Immunologic: Negative. Neurological: Negative. Hematological: Negative. Psychiatric/Behavioral: Negative. Current Outpatient Medications on File Prior to Visit   Medication Sig    ALPRAZolam (XANAX) 0.5 mg tablet Take 1 tablet (0.5 mg total) by mouth 2 (two) times a day as needed for anxiety    escitalopram (LEXAPRO) 20 mg tablet TAKE 1 TABLET BY MOUTH EVERY DAY    finasteride (PROSCAR) 5 mg tablet take 1 tablet by mouth once daily    SUMAtriptan (Imitrex) 50 mg tablet 1 tablet stat for headache. May repeat 1 tablet in 2 hours. No more than 2 to 24 hours    zolpidem (AMBIEN) 10 mg tablet Take 1 tablet (10 mg total) by mouth daily at bedtime as needed for sleep    [DISCONTINUED] amLODIPine (NORVASC) 2.5 mg tablet Take 1 tablet (2.5 mg total) by mouth daily    [DISCONTINUED] atenolol (TENORMIN) 50 mg tablet Take 1 tablet (50 mg total) by mouth 2 (two) times a day    [DISCONTINUED] atorvastatin (LIPITOR) 40 mg tablet Take 40 mg by mouth in the morning. [DISCONTINUED] losartan (COZAAR) 50 mg tablet Take 1 tablet (50 mg total) by mouth daily    [DISCONTINUED] rosuvastatin (CRESTOR) 10 MG tablet TAKE 1 TABLET BY MOUTH EVERY DAY       Objective     /84 (BP Location: Left arm, Patient Position: Sitting, Cuff Size: Large)   Pulse 75   Ht 6' (1.829 m)   Wt 106 kg (234 lb 9.6 oz)   SpO2 98%   BMI 31.82 kg/m²     Physical Exam  Vitals and nursing note reviewed. Constitutional:       Appearance: Normal appearance. HENT:      Head: Normocephalic and atraumatic. Mouth/Throat:      Mouth: Mucous membranes are moist.   Eyes:      General: No scleral icterus. Neck:      Vascular: No carotid bruit. Cardiovascular:      Rate and Rhythm: Normal rate and regular rhythm. Heart sounds: Normal heart sounds. Pulmonary:      Effort: Pulmonary effort is normal.      Breath sounds: Normal breath sounds. Abdominal:      General: Bowel sounds are normal.      Palpations: Abdomen is soft. Tenderness:  There is no abdominal tenderness. Musculoskeletal:      Cervical back: Neck supple. Right lower leg: No edema. Left lower leg: No edema. Skin:     General: Skin is warm and dry. Neurological:      General: No focal deficit present. Mental Status: He is alert. Psychiatric:         Mood and Affect: Mood normal.       Gabriel Hanley,   Depression Screening Follow-up Plan: Patient's depression screening was positive with a PHQ-2 score of . Their PHQ-9 score was 8. Patient assessed for underlying major depression. They have no active suicidal ideations. Brief counseling provided and recommend additional follow-up/re-evaluation next office visit.

## 2023-11-16 NOTE — ASSESSMENT & PLAN NOTE
Lab Results   Component Value Date    EGFR 85 11/04/2023    EGFR 50 02/16/2022    CREATININE 1.07 11/04/2023    CREATININE 1.59 (H) 02/16/2022    CREATININE 0.93 06/05/2015   This is resolved GFR is 85

## 2023-12-01 LAB
25(OH)D3 SERPL-MCNC: 29 NG/ML (ref 30–100)
ALBUMIN SERPL-MCNC: 4.4 G/DL (ref 3.6–5.1)
ALBUMIN/GLOB SERPL: 1.4 (CALC) (ref 1–2.5)
ALP SERPL-CCNC: 143 U/L (ref 35–144)
ALT SERPL-CCNC: 28 U/L (ref 9–46)
APPEARANCE UR: CLEAR
AST SERPL-CCNC: 32 U/L (ref 10–35)
BACTERIA UR QL AUTO: ABNORMAL /HPF
BASOPHILS # BLD AUTO: 28 CELLS/UL (ref 0–200)
BASOPHILS NFR BLD AUTO: 0.4 %
BILIRUB SERPL-MCNC: 0.4 MG/DL (ref 0.2–1.2)
BILIRUB UR QL STRIP: NEGATIVE
BUN SERPL-MCNC: 17 MG/DL (ref 7–25)
BUN/CREAT SERPL: ABNORMAL (CALC) (ref 6–22)
CALCIUM SERPL-MCNC: 9.5 MG/DL (ref 8.6–10.3)
CATECHOLS PLAS-MCNC: 1380 PG/ML
CHLORIDE SERPL-SCNC: 104 MMOL/L (ref 98–110)
CHOLEST SERPL-MCNC: 242 MG/DL
CHOLEST/HDLC SERPL: 4.7 (CALC)
CO2 SERPL-SCNC: 28 MMOL/L (ref 20–32)
COLOR UR: YELLOW
CREAT SERPL-MCNC: 1.07 MG/DL (ref 0.7–1.3)
DOPAMINE 24H UR-MRATE: 24 PG/ML
EOSINOPHIL # BLD AUTO: 133 CELLS/UL (ref 15–500)
EOSINOPHIL NFR BLD AUTO: 1.9 %
EPINEPH PLAS-MCNC: 83 PG/ML
ERYTHROCYTE [DISTWIDTH] IN BLOOD BY AUTOMATED COUNT: 13.2 % (ref 11–15)
GFR/BSA.PRED SERPLBLD CYS-BASED-ARV: 85 ML/MIN/1.73M2
GLOBULIN SER CALC-MCNC: 3.1 G/DL (CALC) (ref 1.9–3.7)
GLUCOSE SERPL-MCNC: 108 MG/DL (ref 65–99)
GLUCOSE UR QL STRIP: NEGATIVE
HBA1C MFR BLD: 5.5 % OF TOTAL HGB
HCT VFR BLD AUTO: 42.6 % (ref 38.5–50)
HCV AB SERPL QL IA: NORMAL
HDLC SERPL-MCNC: 52 MG/DL
HGB BLD-MCNC: 14.4 G/DL (ref 13.2–17.1)
HGB UR QL STRIP: NEGATIVE
HIV 1+2 AB+HIV1 P24 AG SERPL QL IA: NORMAL
HYALINE CASTS #/AREA URNS LPF: ABNORMAL /LPF
KETONES UR QL STRIP: NEGATIVE
LDLC SERPL CALC-MCNC: 153 MG/DL (CALC)
LEUKOCYTE ESTERASE UR QL STRIP: NEGATIVE
LYMPHOCYTES # BLD AUTO: 2163 CELLS/UL (ref 850–3900)
LYMPHOCYTES NFR BLD AUTO: 30.9 %
MCH RBC QN AUTO: 28.9 PG (ref 27–33)
MCHC RBC AUTO-ENTMCNC: 33.8 G/DL (ref 32–36)
MCV RBC AUTO: 85.4 FL (ref 80–100)
MONOCYTES # BLD AUTO: 525 CELLS/UL (ref 200–950)
MONOCYTES NFR BLD AUTO: 7.5 %
NEUTROPHILS # BLD AUTO: 4151 CELLS/UL (ref 1500–7800)
NEUTROPHILS NFR BLD AUTO: 59.3 %
NITRITE UR QL STRIP: NEGATIVE
NONHDLC SERPL-MCNC: 190 MG/DL (CALC)
NOREPINEPH PLAS-MCNC: 1273 PG/ML
PH UR STRIP: 6.5 [PH] (ref 5–8)
PLATELET # BLD AUTO: 302 THOUSAND/UL (ref 140–400)
PMV BLD REES-ECKER: 10 FL (ref 7.5–12.5)
POTASSIUM SERPL-SCNC: 4.5 MMOL/L (ref 3.5–5.3)
PROT SERPL-MCNC: 7.5 G/DL (ref 6.1–8.1)
PROT UR QL STRIP: ABNORMAL
PSA SERPL-MCNC: 0.31 NG/ML
RBC # BLD AUTO: 4.99 MILLION/UL (ref 4.2–5.8)
RBC #/AREA URNS HPF: ABNORMAL /HPF
SODIUM SERPL-SCNC: 140 MMOL/L (ref 135–146)
SP GR UR STRIP: 1.02 (ref 1–1.03)
SQUAMOUS #/AREA URNS HPF: ABNORMAL /HPF
TRIGL SERPL-MCNC: 207 MG/DL
TSH SERPL-ACNC: 1.12 MIU/L (ref 0.4–4.5)
URATE SERPL-MCNC: 7.9 MG/DL (ref 4–8)
WBC # BLD AUTO: 7 THOUSAND/UL (ref 3.8–10.8)
WBC #/AREA URNS HPF: ABNORMAL /HPF

## 2023-12-04 DIAGNOSIS — R82.5 HIGH CATECHOLAMINES: Primary | ICD-10-CM

## 2023-12-05 ENCOUNTER — TELEPHONE (OUTPATIENT)
Dept: ENDOCRINOLOGY | Facility: CLINIC | Age: 48
End: 2023-12-05

## 2023-12-05 ENCOUNTER — TELEPHONE (OUTPATIENT)
Dept: FAMILY MEDICINE CLINIC | Facility: CLINIC | Age: 48
End: 2023-12-05

## 2023-12-05 NOTE — TELEPHONE ENCOUNTER
----- Message from Arturo Villela DO sent at 12/4/2023  7:34 AM EST -----  I will discuss blood work fully and has an appointment next month however patient's catecholamines are extremely elevated. I am ordering a 24-hour urine test for this. I am also ordering an CT have his adrenal glands to evaluate his elevated catecholamines.   In addition the morning endocrinology consultation

## 2023-12-10 DIAGNOSIS — G43.009 MIGRAINE WITHOUT AURA AND WITHOUT STATUS MIGRAINOSUS, NOT INTRACTABLE: ICD-10-CM

## 2023-12-10 DIAGNOSIS — E78.2 MIXED HYPERLIPIDEMIA: ICD-10-CM

## 2023-12-10 DIAGNOSIS — I10 PRIMARY HYPERTENSION: ICD-10-CM

## 2023-12-11 RX ORDER — ROSUVASTATIN CALCIUM 5 MG/1
5 TABLET, COATED ORAL DAILY
Qty: 90 TABLET | Refills: 1 | Status: SHIPPED | OUTPATIENT
Start: 2023-12-11

## 2023-12-11 RX ORDER — ATENOLOL 100 MG/1
100 TABLET ORAL DAILY
Qty: 90 TABLET | Refills: 1 | Status: SHIPPED | OUTPATIENT
Start: 2023-12-11

## 2023-12-11 RX ORDER — AMLODIPINE AND VALSARTAN 5; 160 MG/1; MG/1
1 TABLET ORAL DAILY
Qty: 90 TABLET | Refills: 1 | Status: SHIPPED | OUTPATIENT
Start: 2023-12-11

## 2023-12-28 ENCOUNTER — RA CDI HCC (OUTPATIENT)
Dept: OTHER | Facility: HOSPITAL | Age: 48
End: 2023-12-28

## 2023-12-28 NOTE — PROGRESS NOTES
HCC coding opportunities          Chart Reviewed number of suggestions sent to Provider: 1   F33.9    This is a reminder to address (resolve/update/assess) ALL HCC (risk adjustment) codes as found on active problem list for 2024 as patient scores reset to zero MING.  Also, just a reminder to please review and assess all other chronic conditions for 2024  Patients Insurance        Commercial Insurance: [employer]

## 2024-01-04 ENCOUNTER — OFFICE VISIT (OUTPATIENT)
Dept: FAMILY MEDICINE CLINIC | Facility: CLINIC | Age: 49
End: 2024-01-04
Payer: COMMERCIAL

## 2024-01-04 VITALS
BODY MASS INDEX: 31.02 KG/M2 | HEART RATE: 77 BPM | WEIGHT: 229 LBS | HEIGHT: 72 IN | DIASTOLIC BLOOD PRESSURE: 96 MMHG | SYSTOLIC BLOOD PRESSURE: 148 MMHG | TEMPERATURE: 101.4 F | OXYGEN SATURATION: 99 % | RESPIRATION RATE: 20 BRPM

## 2024-01-04 DIAGNOSIS — R82.5 HIGH CATECHOLAMINES: ICD-10-CM

## 2024-01-04 DIAGNOSIS — G43.009 MIGRAINE WITHOUT AURA AND WITHOUT STATUS MIGRAINOSUS, NOT INTRACTABLE: ICD-10-CM

## 2024-01-04 DIAGNOSIS — I77.810 ASCENDING AORTA DILATION (HCC): ICD-10-CM

## 2024-01-04 DIAGNOSIS — R51.9 ACUTE NONINTRACTABLE HEADACHE, UNSPECIFIED HEADACHE TYPE: ICD-10-CM

## 2024-01-04 DIAGNOSIS — R50.9 FEVER, UNSPECIFIED FEVER CAUSE: Primary | ICD-10-CM

## 2024-01-04 DIAGNOSIS — I10 ACCELERATED HYPERTENSION: ICD-10-CM

## 2024-01-04 DIAGNOSIS — J01.40 ACUTE PANSINUSITIS, RECURRENCE NOT SPECIFIED: ICD-10-CM

## 2024-01-04 DIAGNOSIS — F33.9 DEPRESSION, RECURRENT (HCC): ICD-10-CM

## 2024-01-04 DIAGNOSIS — E78.2 MIXED HYPERLIPIDEMIA: ICD-10-CM

## 2024-01-04 LAB
SARS-COV-2 AG UPPER RESP QL IA: NEGATIVE
SL AMB POCT RAPID FLU A: NEGATIVE
SL AMB POCT RAPID FLU B: NEGATIVE
VALID CONTROL: NORMAL

## 2024-01-04 PROCEDURE — 99214 OFFICE O/P EST MOD 30 MIN: CPT | Performed by: FAMILY MEDICINE

## 2024-01-04 PROCEDURE — 87811 SARS-COV-2 COVID19 W/OPTIC: CPT | Performed by: FAMILY MEDICINE

## 2024-01-04 PROCEDURE — 87804 INFLUENZA ASSAY W/OPTIC: CPT | Performed by: FAMILY MEDICINE

## 2024-01-04 RX ORDER — METHYLPREDNISOLONE 4 MG/1
TABLET ORAL
Qty: 1 EACH | Refills: 0 | Status: SHIPPED | OUTPATIENT
Start: 2024-01-04 | End: 2024-01-10

## 2024-01-04 RX ORDER — SUMATRIPTAN 50 MG/1
TABLET, FILM COATED ORAL
Qty: 9 TABLET | Refills: 3 | Status: SHIPPED | OUTPATIENT
Start: 2024-01-04

## 2024-01-04 RX ORDER — AZITHROMYCIN 250 MG/1
TABLET, FILM COATED ORAL
Qty: 6 TABLET | Refills: 0 | Status: SHIPPED | OUTPATIENT
Start: 2024-01-04 | End: 2024-01-09

## 2024-01-04 RX ORDER — AMLODIPINE AND VALSARTAN 10; 320 MG/1; MG/1
1 TABLET ORAL DAILY
Qty: 30 TABLET | Refills: 5 | Status: SHIPPED | OUTPATIENT
Start: 2024-01-04

## 2024-01-04 NOTE — ASSESSMENT & PLAN NOTE
Order for CT of abdomen attention adrenals reprinted for the patient and wife.  24-hour urine orders reprinted, neither these were completed  Patient instructed to call endocrinology for an appointment

## 2024-01-04 NOTE — PROGRESS NOTES
Name: Chente Weldon      : 1975      MRN: [id number]  Encounter Provider: Gabriel Hanley DO  Encounter Date: 2024   Encounter department: [facility]    Assessment & Plan     1.  Fever  2.  Headache migraine  3.  Acute sinusitis  Rapid COVID, negative  Rapid flu, negative  Z-Ross ordered  4.   hypertension  Continue atenolol 100 mg daily  Increase amlodipine/valsartan 5/160 up to 10/320 daily  5.  Ascending aortic dilation, patient was referred to cardiology, CT was ordered not completed order was reprinted and given to the patient's wife, importance discussed  6.  High catecholamines  Importance discussed  Patient to complete 24-hour urine, orders reprinted and given to patient and wife  CT of abdomen attention adrenals again order reprinted  Patient to call endocrinology and set up appointment as they called him  Importance of this discussed  6.  Hyperlipidemia patient is on Crestor complete blood work as ordered  7.  MDD, apparently stable on Lexapro  8.  Return in 4 weeks however if patient not better in 1 week return to office #9.  Migraine headache, Imitrex reordered, Medrol Dosepak ordered as above        1. Fever, unspecified fever cause  -     POCT Rapid Covid Ag  -     POCT rapid flu A and B    2. Acute nonintractable headache, unspecified headache type  -     methylPREDNISolone 4 MG tablet therapy pack; Use as directed on package    3. Accelerated hypertension  Assessment & Plan:  Crease amlodipine/valsartan 5/160 up to 10/320    Orders:  -     amLODIPine-valsartan (EXFORGE)  MG per tablet; Take 1 tablet by mouth daily    4. Ascending aorta dilation (HCC)  Assessment & Plan:  Order for CT reprinted and given to patient and wife importance discussed      5. Migraine without aura and without status migrainosus, not intractable  Assessment & Plan:  Medrol Dosepak ordered    Orders:  -     methylPREDNISolone 4 MG tablet therapy pack; Use as directed on package    6.  High catecholamines  Assessment & Plan:  Order for CT of abdomen attention adrenals reprinted for the patient and wife.  24-hour urine orders reprinted, neither these were completed  Patient instructed to call endocrinology for an appointment      7. Mixed hyperlipidemia  Assessment & Plan:  Patient is to complete blood work since he is on rosuvastatin      8. Acute pansinusitis, recurrence not specified  -     azithromycin (ZITHROMAX) 250 mg tablet; Take 2 tablets today then 1 tablet daily till finished    9. Depression, recurrent (HCC)  Assessment & Plan:  Stable on Lexapro           Subjective      For the past 2 days patient's had a severe headache fever chills postnasal drip nasal congestion.  Patient did not check for COVID at home.  No medication was taken.  Patient did not complete CT of chest and abdomen and did not complete his blood work CMP and lipid or 24-hour urine.  Endocrinology did call him but he could not make the appointment because of work      Review of Systems   Constitutional:         HPI   HENT:          HPI   Eyes: Negative.    Respiratory: Negative.     Cardiovascular: Negative.    Gastrointestinal: Negative.    Endocrine:        HPI   Genitourinary: Negative.    Musculoskeletal: Negative.    Skin: Negative.    Allergic/Immunologic: Negative.    Neurological:         HPI   Hematological: Negative.    Psychiatric/Behavioral: Negative.         Current Outpatient Medications on File Prior to Visit   Medication Sig    ALPRAZolam (XANAX) 0.5 mg tablet Take 1 tablet (0.5 mg total) by mouth 2 (two) times a day as needed for anxiety    atenolol (TENORMIN) 100 mg tablet TAKE 1 TABLET BY MOUTH EVERY DAY    escitalopram (LEXAPRO) 20 mg tablet TAKE 1 TABLET BY MOUTH EVERY DAY    finasteride (PROSCAR) 5 mg tablet take 1 tablet by mouth once daily    rosuvastatin (CRESTOR) 5 mg tablet TAKE 1 TABLET (5 MG TOTAL) BY MOUTH DAILY.    SUMAtriptan (Imitrex) 50 mg tablet 1 tablet stat for headache.  May repeat  1 tablet in 2 hours.  No more than 2 to 24 hours    zolpidem (AMBIEN) 10 mg tablet Take 1 tablet (10 mg total) by mouth daily at bedtime as needed for sleep    [DISCONTINUED] amLODIPine-valsartan (EXFORGE) 5-160 MG per tablet TAKE 1 TABLET BY MOUTH EVERY DAY       Objective     /96   Pulse 77   Temp (!) 101.4 °F (38.6 °C) (Tympanic)   Resp 20   Ht 6' (1.829 m)   Wt 104 kg (229 lb)   SpO2 99%   BMI 31.06 kg/m²     Physical Exam  Vitals and nursing note reviewed.   Constitutional:       General: He is not in acute distress.     Appearance: Normal appearance. He is not ill-appearing, toxic-appearing or diaphoretic.   HENT:      Head: Normocephalic and atraumatic.      Ears:      Comments: Panic membranes are dull no injection     Nose:      Comments: Boggy/allergic turbinate pansinus tenderness to percussion     Mouth/Throat:      Mouth: Mucous membranes are moist.      Pharynx: No oropharyngeal exudate or posterior oropharyngeal erythema.      Comments: Throat postnasal drip, purulent  Eyes:      General: No scleral icterus.        Right eye: No discharge.         Left eye: No discharge.      Extraocular Movements: Extraocular movements intact.      Conjunctiva/sclera: Conjunctivae normal.      Pupils: Pupils are equal, round, and reactive to light.   Cardiovascular:      Rate and Rhythm: Normal rate and regular rhythm.      Heart sounds: Normal heart sounds.   Pulmonary:      Effort: Pulmonary effort is normal.      Breath sounds: Normal breath sounds.   Abdominal:      General: Bowel sounds are normal.      Palpations: Abdomen is soft.      Tenderness: There is no abdominal tenderness.   Musculoskeletal:      Cervical back: Neck supple. No rigidity or tenderness.      Right lower leg: No edema.      Left lower leg: No edema.   Lymphadenopathy:      Cervical: Cervical adenopathy present.   Skin:     General: Skin is warm and dry.   Neurological:      General: No focal deficit present.      Mental Status:  He is alert.   Psychiatric:         Mood and Affect: Mood normal.       Gabriel Hanley,

## 2024-01-04 NOTE — PATIENT INSTRUCTIONS
Complete CAT scan of chest for possible aneurysm of your large aorta artery  Pleat CAT scan of abdomen must evaluate your adrenals because of your high catecholamine  Complete 24-hour urine for catecholamines  Complete CMP and LDL for cholesterol  Increase your amlodipine/losartan from 5/160 up to 10/320, new prescription was sent to pharmacy  Finish Medrol Dosepak for headache  If not improving in 24 to 48 hours call office if worsen report to ER, if not better next week return to office  Recheck 4 weeks sooner if needed

## 2024-01-08 ENCOUNTER — VBI (OUTPATIENT)
Dept: ADMINISTRATIVE | Facility: OTHER | Age: 49
End: 2024-01-08

## 2024-01-08 NOTE — TELEPHONE ENCOUNTER
01/08/24 9:53 AM     [employer] SmartForm used to document caregap status.    Linda Gant   
Detail Level: Simple
Detail Level: Detailed

## 2024-01-17 ENCOUNTER — HOSPITAL ENCOUNTER (OUTPATIENT)
Dept: CT IMAGING | Facility: HOSPITAL | Age: 49
Discharge: HOME | End: 2024-01-17
Payer: COMMERCIAL

## 2024-01-17 DIAGNOSIS — R82.5 HIGH CATECHOLAMINES: ICD-10-CM

## 2024-01-17 DIAGNOSIS — I77.810 ASCENDING AORTA DILATION (HCC): ICD-10-CM

## 2024-01-17 PROCEDURE — G1004 CDSM NDSC: HCPCS

## 2024-01-17 PROCEDURE — 71250 CT THORAX DX C-: CPT

## 2024-01-17 PROCEDURE — 74170 CT ABD WO CNTRST FLWD CNTRST: CPT

## 2024-01-17 RX ADMIN — IOHEXOL 85 ML: 350 INJECTION, SOLUTION INTRAVENOUS at 20:49

## 2024-01-25 ENCOUNTER — DOCUMENTATION (OUTPATIENT)
Dept: HEMATOLOGY ONCOLOGY | Facility: CLINIC | Age: 49
End: 2024-01-25

## 2024-01-25 ENCOUNTER — TELEPHONE (OUTPATIENT)
Dept: FAMILY MEDICINE CLINIC | Facility: CLINIC | Age: 49
End: 2024-01-25

## 2024-01-25 DIAGNOSIS — R91.1 PULMONARY NODULE: ICD-10-CM

## 2024-01-25 DIAGNOSIS — I77.810 ASCENDING AORTA DILATION (HCC): Primary | ICD-10-CM

## 2024-01-25 NOTE — PROGRESS NOTES
Intake received/ Chart reviewed for completion of workup     Pathology completed: N/A     Imaging completed: 01/17/24 CT chest wo contrast (PACS)  11/06/23 ECHO stress test (PACS)     Outreach to Thoracic Surgery via Staff Message for appropriate scheduling - per Halie Lewis patient should be referred to cardiovascular surgery.  Tiger Text sent to PCP who will refer patient to cardiovascular surgery.

## 2024-01-25 NOTE — TELEPHONE ENCOUNTER
LVM to please call back regarding test results.    Gabriel Hanley, DO  1/24/2024  3:48 PM EST       Of abdomen shows no adrenal mass or enlargement.  It does show liver is mildly enlarged which can be a normal variant liver itself looks fine no treatment or workup is needed     Gabriel Hanley, DO  1/25/2024 10:49 AM EST       Patient's thoracic aorta and is widened.  Per radiology thoracic surgery consultation to be obtained, I have ordered this.  In addition there is a small left upper lobe nodule, 3 mm, I recommend CT repeated in 1 year, ordered

## 2024-01-29 ENCOUNTER — RA CDI HCC (OUTPATIENT)
Dept: OTHER | Facility: HOSPITAL | Age: 49
End: 2024-01-29

## 2024-01-29 NOTE — PROGRESS NOTES
HCC coding opportunities       Chart reviewed, no opportunity found: CHART REVIEWED, NO OPPORTUNITY FOUND   This is a reminder to address (resolve/update/assess) ALL HCC (risk adjustment) codes as found on active problem list for 2024 as patient scores reset to zero MING.  Also, just a reminder to please review and assess all other chronic conditions for 2024     Patients Insurance        Commercial Insurance: [employer] Commercial Insurance

## 2024-01-30 ENCOUNTER — CONSULT (OUTPATIENT)
Dept: CARDIOLOGY CLINIC | Facility: CLINIC | Age: 49
End: 2024-01-30
Payer: COMMERCIAL

## 2024-01-30 VITALS
BODY MASS INDEX: 30.41 KG/M2 | HEART RATE: 67 BPM | SYSTOLIC BLOOD PRESSURE: 146 MMHG | WEIGHT: 224.2 LBS | DIASTOLIC BLOOD PRESSURE: 110 MMHG

## 2024-01-30 DIAGNOSIS — I10 PRIMARY HYPERTENSION: ICD-10-CM

## 2024-01-30 DIAGNOSIS — I51.7 LEFT VENTRICULAR HYPERTROPHY: ICD-10-CM

## 2024-01-30 DIAGNOSIS — E78.5 DYSLIPIDEMIA: ICD-10-CM

## 2024-01-30 DIAGNOSIS — R94.31 ABNORMAL ECG: ICD-10-CM

## 2024-01-30 DIAGNOSIS — I10 ACCELERATED HYPERTENSION: ICD-10-CM

## 2024-01-30 DIAGNOSIS — I77.810 ASCENDING AORTA DILATION (HCC): Primary | ICD-10-CM

## 2024-01-30 DIAGNOSIS — R94.31 ABNORMAL EKG: ICD-10-CM

## 2024-01-30 DIAGNOSIS — I10 ESSENTIAL HYPERTENSION: ICD-10-CM

## 2024-01-30 PROCEDURE — 99244 OFF/OP CNSLTJ NEW/EST MOD 40: CPT | Performed by: INTERNAL MEDICINE

## 2024-01-30 PROCEDURE — 93000 ELECTROCARDIOGRAM COMPLETE: CPT | Performed by: INTERNAL MEDICINE

## 2024-01-30 RX ORDER — CARVEDILOL 12.5 MG/1
12.5 TABLET ORAL 2 TIMES DAILY WITH MEALS
Qty: 180 TABLET | Refills: 3 | Status: SHIPPED | OUTPATIENT
Start: 2024-01-30 | End: 2024-04-29

## 2024-01-30 NOTE — PROGRESS NOTES
Cardiology Office Note  MD Alexandro Manzanares MD, [facility]  Manuel Echavarria DO, [facility]  MD Jules Cobian DO, [facility]  Jason Portillo DO, [facility]  ----------------------------------------------------------------  [address] [address]    Chente Weldon 48 y.o. male MRN: [id number]  Unit/Bed#:  Encounter: [id number]      History of Present Illness:  It was a pleasure to see Chente Weldon in the office today for initial CV evaluation. He has a past medical history of aortic dilatation, hypertension, dyslipidemia and left ventricular hypertrophy.  He has a family history of hypertension, but no family history of aneurysm of the aorta. He establish care with us in January 2024.  Patient began to experience some shortness of breath with dyspnea on exertion starting in early January 2023.  The shortness of breath overall throughout 2023 into early 2024 was fairly consistent.  He noticed that when he would do significant physical activity, he becomes short of breath and sweaty.  There was no associated chest discomfort or nausea.  Due to his symptoms, he was seen by primary care at [facility].  Aside from some mild peripheral edema in his right lower extremity in early January 2024, he had no significant swelling.  Denies palpitations, lightheadedness, dizziness.  Denies paroxysmal nocturnal dyspnea or orthopnea.    Review of Systems:  Review of Systems   Constitutional: Negative for decreased appetite, fever, weight gain and weight loss.   HENT:  Negative for congestion and sore throat.    Eyes:  Negative for visual disturbance.   Cardiovascular:  Positive for dyspnea on exertion and leg swelling. Negative for chest pain, near-syncope and palpitations.   Respiratory:  Positive for shortness of breath. Negative for cough.    Hematologic/Lymphatic: Negative for bleeding problem.   Skin:  Negative for rash.   Musculoskeletal:  Negative for myalgias and neck  pain.   Gastrointestinal:  Negative for abdominal pain and nausea.   Neurological:  Negative for light-headedness and weakness.   Psychiatric/Behavioral:  Negative for depression.        Past Medical History:   Diagnosis Date    Anxiety     Hypertension        Past Surgical History:   Procedure Laterality Date    BACK SURGERY  10/2021    SPINE SURGERY  2020    fusion    TRICEPS TENDON RELEASE         Social History     Socioeconomic History    Marital status: /Civil Union     Spouse name: None    Number of children: None    Years of education: None    Highest education level: None   Occupational History    None   Tobacco Use    Smoking status: Former     Current packs/day: 0.00     Average packs/day: 1 pack/day for 15.0 years (15.0 ttl pk-yrs)     Types: Cigarettes     Start date: 2006     Quit date: 2021     Years since quittin.8    Smokeless tobacco: Never   Vaping Use    Vaping status: Never Used   Substance and Sexual Activity    Alcohol use: Yes     Comment: social    Drug use: No    Sexual activity: None   Other Topics Concern    None   Social History Narrative    Caffeine- 2cups coffe or soda per day    Full time-      Social Determinants of Health     Financial Resource Strain: Not on file   Food Insecurity: Not on file   Transportation Needs: Not on file   Physical Activity: Not on file   Stress: Not on file   Social Connections: Not on file   Intimate Partner Violence: Not on file   Housing Stability: Not on file       Family History   Problem Relation Age of Onset    Hypertension Father     Kidney cancer Father        No Known Allergies      Current Outpatient Medications:     ALPRAZolam (XANAX) 0.5 mg tablet, Take 1 tablet (0.5 mg total) by mouth 2 (two) times a day as needed for anxiety, Disp: 30 tablet, Rfl: 2    amLODIPine-valsartan (EXFORGE)  MG per tablet, Take 1 tablet by mouth daily, Disp: 30 tablet, Rfl: 5    carvedilol (COREG) 12.5 mg tablet, Take 1  tablet (12.5 mg total) by mouth 2 (two) times a day with meals, Disp: 180 tablet, Rfl: 3    escitalopram (LEXAPRO) 20 mg tablet, TAKE 1 TABLET BY MOUTH EVERY DAY, Disp: 90 tablet, Rfl: 2    finasteride (PROSCAR) 5 mg tablet, take 1 tablet by mouth once daily, Disp: 30 tablet, Rfl: 3    rosuvastatin (CRESTOR) 5 mg tablet, TAKE 1 TABLET (5 MG TOTAL) BY MOUTH DAILY., Disp: 90 tablet, Rfl: 1    SUMAtriptan (Imitrex) 50 mg tablet, 1 tablet stat for headache.  May repeat 1 tablet in 2 hours.  No more than 2 to 24 hours, Disp: 9 tablet, Rfl: 3    zolpidem (AMBIEN) 10 mg tablet, Take 1 tablet (10 mg total) by mouth daily at bedtime as needed for sleep, Disp: 30 tablet, Rfl: 2    Vitals:    01/30/24 1301   BP: (!) 146/110   BP Location: Left arm   Patient Position: Sitting   Cuff Size: Large   Pulse: 67   Weight: 102 kg (224 lb 3.2 oz)     Body mass index is 30.41 kg/m².    PHYSICAL EXAMINATION:  Gen: Awake, Alert, NAD   Head/eyes: AT/NC, pupils equal and round, Anicteric  ENT: mmm  Neck: Supple, No elevated JVP, trachea midline  Resp: CTA bilaterally no w/r/r  CV: RRR +S1, S2, No m/r/g  Abd: Soft, NT/ND + BS  Ext: no LE edema bilaterally  Neuro: Follows commands, moves all extermities  Psych: Appropriate affect, normal mood, pleasant attitude, non-combative  Skin: warm; no rash, erythema or venous stasis changes on exposed skin    --------------------------------------------------------------------------------  TREADMILL STRESS  No results found for this or any previous visit.     --------------------------------------------------------------------------------  NUCLEAR STRESS TEST: No results found for this or any previous visit.    No results found for this or any previous visit.      --------------------------------------------------------------------------------  CATH:  No results found for this or any previous visit.    --------------------------------------------------------------------------------  ECHO:   No results  "found for this or any previous visit.    No results found for this or any previous visit.    --------------------------------------------------------------------------------  HOLTER  No results found for this or any previous visit.    No results found for this or any previous visit.    --------------------------------------------------------------------------------  CAROTIDS  No results found for this or any previous visit.     --------------------------------------------------------------------------------  ECGs:  Results for orders placed or performed in visit on 01/30/24   POCT ECG    Impression    Sinus rhythm 67 bpm, inferolateral T wave abnormalities        Lab Results   Component Value Date    WBC 7.0 11/04/2023    HGB 14.4 11/04/2023    HCT 42.6 11/04/2023    MCV 85.4 11/04/2023     11/04/2023      Lab Results   Component Value Date    SODIUM 140 11/04/2023    K 4.5 11/04/2023     11/04/2023    CO2 28 11/04/2023    BUN 17 11/04/2023    CREATININE 1.07 11/04/2023    GLUC 108 (H) 11/04/2023    CALCIUM 9.5 11/04/2023      Lab Results   Component Value Date    HGBA1C 5.5 11/04/2023      Lab Results   Component Value Date    CHOL 226 06/05/2015     Lab Results   Component Value Date    HDL 52 11/04/2023    HDL 45 02/16/2022    HDL 49 06/05/2015     Lab Results   Component Value Date    LDLCALC 153 (H) 11/04/2023    LDLCALC 138 (H) 02/16/2022    LDLCALC 126 (H) 06/05/2015     Lab Results   Component Value Date    TRIG 207 (H) 11/04/2023    TRIG 320 (H) 02/16/2022    TRIG 253 06/05/2015     No results found for: \"CHOLHDL\"   No results found for: \"INR\", \"PROTIME\"     1. Ascending aorta dilation (HCC)  -     Ambulatory Referral to Cardiology  -     POCT ECG  -     Ambulatory referral to Cardiovascular Surgery; Future  -     carvedilol (COREG) 12.5 mg tablet; Take 1 tablet (12.5 mg total) by mouth 2 (two) times a day with meals    2. Abnormal ECG    3. Essential hypertension  -     POCT ECG  -     " carvedilol (COREG) 12.5 mg tablet; Take 1 tablet (12.5 mg total) by mouth 2 (two) times a day with meals    4. Dyslipidemia    5. Primary hypertension  -     Ambulatory Referral to Cardiology    6. Accelerated hypertension  -     Ambulatory Referral to Cardiology    7. Left ventricular hypertrophy  -     Ambulatory Referral to Cardiology    8. Abnormal EKG  -     Ambulatory Referral to Cardiology        IMPRESSION:    Ascending aortic dilatation at 4.9 cm by CT chest, January 2024  Dyspnea on exertion  CAD with coronary calcifications by CT chest, January 2024  Abnormal ECG with LVH  LVEF 69%, grade 1 diastolic dysfunction, mild to moderate LVH, AV sclerosis, trace MR, aortic dilatation at 4.6 cm, November 2023  Hypertension  Elevated plasma catecholamines with spike in norepinephrine and dopamine levels, November 2023  Renal artery ultrasound without stenosis by Doppler, 2022  Dyslipidemia    PLAN:  It was a pleasure to see Chente in the office today for initial CV evaluation.  He is here today due to his longstanding history of hypertension as well as his symptoms of shortness of breath and finding of ascending aortic dilatation.  He has some shortness of breath with exertion, but no signs or symptoms of heart failure and clinically examines to be euvolemic.  His shortness of breath might be related to his elevated blood pressure readings that likely become significantly elevated as noted prior to his stress test that was aborted in November 2023.  He is tolerating his current medications without any reported adverse effects.  The patient also perform greater than 4 METS, but does admit to some mild shortness of breath.  ECG shows inferolateral T wave abnormalities without evidence of prior infarct.  Echocardiogram performed in November 2023 showed normal left ventricular systolic function with LVH which likely explains some of his ECG findings.  Based on his clinical presentation, I have the following  "recommendations:    1.  Recommend aggressive blood pressure management.  We will increase his blood pressure regimen by stopping atenolol and starting carvedilol 12.5 mg twice daily.  Continue amlodipine 10 mg daily and valsartan 320 mg daily.  Would further uptitrate the carvedilol as needed and as heart rate tolerates for appropriate BP control.  Next medication would consider spironolactone.  2.  Recommend heart healthy diet low in sodium and carbohydrate.  Recommend a salt restriction of 1500 mg of sodium per day.  3.  Would recommend a lifting restriction of 50 pounds given his aortic dilatation  4.  Will place referral to cardiothoracic surgery to evaluate his aortic dilatation.  5.  Continue statin therapy.  Goal LDL is less than 70 mg/dL.  Would recommend increasing the rosuvastatin at our follow-up encounter.  Would not do that at this time as we are already titrating other medications.  6.  Would encourage physical activity as tolerated to build cardiovascular endurance with 30 minutes a day, 5 days a week.  Weight restriction for lifting as above.  7.  Follow-up with primary care regarding elevated catecholamines on recent lab work.  8.  Once blood pressure is adequately controlled, would consider ischemic evaluation with stress test to reevaluate.  Will defer that for now as he is only experiencing some shortness of breath which is more nonspecific.  9.  Will follow-up with him in 6 to 8 weeks to reassess his progress.    As always, please not hesitate to call with any questions.    Portions of the record may have been created with voice recognition software. Occasional wrong word or \"sound a like\" substitutions may have occurred due to the inherent limitations of voice recognition software. Read the chart carefully and recognize, using context, where substitutions have occurred.      Signed: Manuel Echavarria DO, FACC, FASANCA, FACP  "

## 2024-02-06 ENCOUNTER — TELEPHONE (OUTPATIENT)
Dept: CARDIAC SURGERY | Facility: CLINIC | Age: 49
End: 2024-02-06

## 2024-02-06 NOTE — TELEPHONE ENCOUNTER
Patient referred for 4.2 cm aneurysm.  LM for patient to schedule a consult w/ Brisa HERNANDES PA-C

## 2024-02-08 ENCOUNTER — TELEPHONE (OUTPATIENT)
Age: 49
End: 2024-02-08

## 2024-02-12 NOTE — LETTER
January 4, 2024     Patient: Chente Weldon   YOB: 1975   Date of Visit: 1/4/2024       To Whom It May Concern:    It is my medical opinion that Chente Weldon may return to work on 1/8/2024 .    If you have any questions or concerns, please don't hesitate to call.         Sincerely,        Gabriel Hanley,     CC: No Recipients   Elective Induction

## 2024-03-12 ENCOUNTER — TELEPHONE (OUTPATIENT)
Dept: CARDIAC SURGERY | Facility: CLINIC | Age: 49
End: 2024-03-12

## 2024-03-12 NOTE — TELEPHONE ENCOUNTER
Dean for patient to schedule consult w/ Dr. Odonnell.  He reviewed CT chest scan, patient referred to aortic clinic for 4.6 cm aneurysm.

## 2024-03-19 ENCOUNTER — TELEPHONE (OUTPATIENT)
Dept: CARDIAC SURGERY | Facility: CLINIC | Age: 49
End: 2024-03-19

## 2024-03-21 ENCOUNTER — RA CDI HCC (OUTPATIENT)
Dept: OTHER | Facility: HOSPITAL | Age: 49
End: 2024-03-21

## 2024-04-25 ENCOUNTER — OFFICE VISIT (OUTPATIENT)
Dept: FAMILY MEDICINE CLINIC | Facility: CLINIC | Age: 49
End: 2024-04-25
Payer: COMMERCIAL

## 2024-04-25 VITALS
BODY MASS INDEX: 31.87 KG/M2 | DIASTOLIC BLOOD PRESSURE: 78 MMHG | WEIGHT: 235 LBS | SYSTOLIC BLOOD PRESSURE: 130 MMHG | HEART RATE: 78 BPM | TEMPERATURE: 97.5 F | OXYGEN SATURATION: 98 %

## 2024-04-25 DIAGNOSIS — I77.810 ASCENDING AORTA DILATION (HCC): ICD-10-CM

## 2024-04-25 DIAGNOSIS — F33.9 DEPRESSION, RECURRENT (HCC): ICD-10-CM

## 2024-04-25 DIAGNOSIS — F41.9 ANXIETY: ICD-10-CM

## 2024-04-25 DIAGNOSIS — I51.7 LEFT VENTRICULAR HYPERTROPHY: ICD-10-CM

## 2024-04-25 DIAGNOSIS — M10.9 GOUTY ARTHRITIS: ICD-10-CM

## 2024-04-25 DIAGNOSIS — R73.9 HYPERGLYCEMIA: ICD-10-CM

## 2024-04-25 DIAGNOSIS — I10 ACCELERATED HYPERTENSION: Primary | ICD-10-CM

## 2024-04-25 DIAGNOSIS — Z12.11 SCREENING FOR COLON CANCER: ICD-10-CM

## 2024-04-25 DIAGNOSIS — E55.9 VITAMIN D DEFICIENCY: ICD-10-CM

## 2024-04-25 DIAGNOSIS — I10 PRIMARY HYPERTENSION: ICD-10-CM

## 2024-04-25 DIAGNOSIS — R35.1 NOCTURIA: ICD-10-CM

## 2024-04-25 DIAGNOSIS — Z12.5 SCREENING FOR PROSTATE CANCER: ICD-10-CM

## 2024-04-25 DIAGNOSIS — R91.1 PULMONARY NODULE: ICD-10-CM

## 2024-04-25 DIAGNOSIS — R94.31 ABNORMAL EKG: ICD-10-CM

## 2024-04-25 DIAGNOSIS — R82.5 HIGH CATECHOLAMINES: ICD-10-CM

## 2024-04-25 DIAGNOSIS — E78.2 MIXED HYPERLIPIDEMIA: ICD-10-CM

## 2024-04-25 PROCEDURE — 99214 OFFICE O/P EST MOD 30 MIN: CPT | Performed by: FAMILY MEDICINE

## 2024-04-25 NOTE — PATIENT INSTRUCTIONS
Complete blood work for cholesterol in the next week or so  Complete 24-hour urine for elevated catecholamines in the next week or so  Follow with cardiology per their recommendations  Follow with cardiothoracic surgery tomorrow as planned with Dr. Claros for dilation of aorta  Continue present therapy  Return in 6 months for office visit and blood work sooner if needed

## 2024-04-25 NOTE — PROGRESS NOTES
Name: Chente Weldon      : 1975      MRN: [id number]  Encounter Provider: Gabriel Hanley DO  Encounter Date: 2024   Encounter department: [facility]    Assessment & Plan     1.  Accelerated/primary hypertension, stable continue present therapy  2.  Ascending aortic dilation, to see Dr. Claros tomorrow  3.  LVH/abnormal EKG, patient is following with cardiology, Dr. Echavarria  4.  Gouty arthritis, uric acid ordered  5.  Hyperlipidemia patient to complete blood work continue Crestor 5 mg daily  6.  Anxiety/depression, stable Lexapro  7.  Hyperglycemia blood work ordered  8.  High catecholamines, I reprinted order patient needs to complete 24-hour urine.  CT was negative for adrenal adenoma  9.  Screening colon cancer, patient to complete Cologuard kit  10.  Screening prostate cancer/nocturia, PSA ordered  11.  Vitamin D deficiency blood work ordered  12.  Patient to return in 6 months for office visit and blood work sooner if needed          1. Accelerated hypertension  Assessment & Plan:  Stable seems well-controlled patient to follow with cardiology    Orders:  -     CBC; Future; Expected date: 10/26/2024  -     Comprehensive metabolic panel; Future; Expected date: 10/26/2024  -     Hemoglobin A1C; Future; Expected date: 10/26/2024  -     Lipid Panel with Direct LDL reflex; Future; Expected date: 10/26/2024  -     PSA Total, Diagnostic; Future; Expected date: 10/26/2024  -     TSH, 3rd generation with Free T4 reflex; Future; Expected date: 10/26/2024  -     UA (URINE) with reflex to Scope; Future; Expected date: 10/26/2024  -     Vitamin D 25 hydroxy; Future; Expected date: 10/26/2024  -     Uric acid; Future; Expected date: 10/26/2024    2. Left ventricular hypertrophy  Assessment & Plan:  Follows with cardiology    Orders:  -     CBC; Future; Expected date: 10/26/2024  -     Comprehensive metabolic panel; Future; Expected date: 10/26/2024  -     Hemoglobin A1C; Future;  Expected date: 10/26/2024  -     Lipid Panel with Direct LDL reflex; Future; Expected date: 10/26/2024  -     PSA Total, Diagnostic; Future; Expected date: 10/26/2024  -     TSH, 3rd generation with Free T4 reflex; Future; Expected date: 10/26/2024  -     UA (URINE) with reflex to Scope; Future; Expected date: 10/26/2024  -     Vitamin D 25 hydroxy; Future; Expected date: 10/26/2024  -     Uric acid; Future; Expected date: 10/26/2024    3. Abnormal EKG  -     CBC; Future; Expected date: 10/26/2024  -     Comprehensive metabolic panel; Future; Expected date: 10/26/2024  -     Hemoglobin A1C; Future; Expected date: 10/26/2024  -     Lipid Panel with Direct LDL reflex; Future; Expected date: 10/26/2024  -     PSA Total, Diagnostic; Future; Expected date: 10/26/2024  -     TSH, 3rd generation with Free T4 reflex; Future; Expected date: 10/26/2024  -     UA (URINE) with reflex to Scope; Future; Expected date: 10/26/2024  -     Vitamin D 25 hydroxy; Future; Expected date: 10/26/2024  -     Uric acid; Future; Expected date: 10/26/2024    4. Ascending aorta dilation (HCC)  Assessment & Plan:  Has appointment with CT surgery tomorrow, Dr. Odonnell    Orders:  -     CBC; Future; Expected date: 10/26/2024  -     Comprehensive metabolic panel; Future; Expected date: 10/26/2024  -     Hemoglobin A1C; Future; Expected date: 10/26/2024  -     Lipid Panel with Direct LDL reflex; Future; Expected date: 10/26/2024  -     PSA Total, Diagnostic; Future; Expected date: 10/26/2024  -     TSH, 3rd generation with Free T4 reflex; Future; Expected date: 10/26/2024  -     UA (URINE) with reflex to Scope; Future; Expected date: 10/26/2024  -     Vitamin D 25 hydroxy; Future; Expected date: 10/26/2024  -     Uric acid; Future; Expected date: 10/26/2024    5. Pulmonary nodule  Assessment & Plan:  Will need repeat CT 1/25    Orders:  -     CBC; Future; Expected date: 10/26/2024  -     Comprehensive metabolic panel; Future; Expected date:  10/26/2024  -     Hemoglobin A1C; Future; Expected date: 10/26/2024  -     Lipid Panel with Direct LDL reflex; Future; Expected date: 10/26/2024  -     PSA Total, Diagnostic; Future; Expected date: 10/26/2024  -     TSH, 3rd generation with Free T4 reflex; Future; Expected date: 10/26/2024  -     UA (URINE) with reflex to Scope; Future; Expected date: 10/26/2024  -     Vitamin D 25 hydroxy; Future; Expected date: 10/26/2024  -     Uric acid; Future; Expected date: 10/26/2024    6. Gouty arthritis  Assessment & Plan:  Asymptomatic blood work ordered    Orders:  -     CBC; Future; Expected date: 10/26/2024  -     Comprehensive metabolic panel; Future; Expected date: 10/26/2024  -     Hemoglobin A1C; Future; Expected date: 10/26/2024  -     Lipid Panel with Direct LDL reflex; Future; Expected date: 10/26/2024  -     PSA Total, Diagnostic; Future; Expected date: 10/26/2024  -     TSH, 3rd generation with Free T4 reflex; Future; Expected date: 10/26/2024  -     UA (URINE) with reflex to Scope; Future; Expected date: 10/26/2024  -     Vitamin D 25 hydroxy; Future; Expected date: 10/26/2024  -     Uric acid; Future; Expected date: 10/26/2024    7. Depression, recurrent (HCC)  Assessment & Plan:  Stable on Lexapro    Orders:  -     CBC; Future; Expected date: 10/26/2024  -     Comprehensive metabolic panel; Future; Expected date: 10/26/2024  -     Hemoglobin A1C; Future; Expected date: 10/26/2024  -     Lipid Panel with Direct LDL reflex; Future; Expected date: 10/26/2024  -     PSA Total, Diagnostic; Future; Expected date: 10/26/2024  -     TSH, 3rd generation with Free T4 reflex; Future; Expected date: 10/26/2024  -     UA (URINE) with reflex to Scope; Future; Expected date: 10/26/2024  -     Vitamin D 25 hydroxy; Future; Expected date: 10/26/2024  -     Uric acid; Future; Expected date: 10/26/2024    8. Anxiety  Assessment & Plan:  Stable on Lexapro    Orders:  -     CBC; Future; Expected date: 10/26/2024  -      Comprehensive metabolic panel; Future; Expected date: 10/26/2024  -     Hemoglobin A1C; Future; Expected date: 10/26/2024  -     Lipid Panel with Direct LDL reflex; Future; Expected date: 10/26/2024  -     PSA Total, Diagnostic; Future; Expected date: 10/26/2024  -     TSH, 3rd generation with Free T4 reflex; Future; Expected date: 10/26/2024  -     UA (URINE) with reflex to Scope; Future; Expected date: 10/26/2024  -     Vitamin D 25 hydroxy; Future; Expected date: 10/26/2024  -     Uric acid; Future; Expected date: 10/26/2024    9. High catecholamines  -     CBC; Future; Expected date: 10/26/2024  -     Comprehensive metabolic panel; Future; Expected date: 10/26/2024  -     Hemoglobin A1C; Future; Expected date: 10/26/2024  -     Lipid Panel with Direct LDL reflex; Future; Expected date: 10/26/2024  -     PSA Total, Diagnostic; Future; Expected date: 10/26/2024  -     TSH, 3rd generation with Free T4 reflex; Future; Expected date: 10/26/2024  -     UA (URINE) with reflex to Scope; Future; Expected date: 10/26/2024  -     Vitamin D 25 hydroxy; Future; Expected date: 10/26/2024  -     Uric acid; Future; Expected date: 10/26/2024    10. Hyperglycemia  Assessment & Plan:  Blood work ordered    Orders:  -     CBC; Future; Expected date: 10/26/2024  -     Comprehensive metabolic panel; Future; Expected date: 10/26/2024  -     Hemoglobin A1C; Future; Expected date: 10/26/2024  -     Lipid Panel with Direct LDL reflex; Future; Expected date: 10/26/2024  -     PSA Total, Diagnostic; Future; Expected date: 10/26/2024  -     TSH, 3rd generation with Free T4 reflex; Future; Expected date: 10/26/2024  -     UA (URINE) with reflex to Scope; Future; Expected date: 10/26/2024  -     Vitamin D 25 hydroxy; Future; Expected date: 10/26/2024  -     Uric acid; Future; Expected date: 10/26/2024    11. Mixed hyperlipidemia  Assessment & Plan:  Patient to complete blood work is on Crestor 5 mg daily    Orders:  -     CBC; Future;  Expected date: 10/26/2024  -     Comprehensive metabolic panel; Future; Expected date: 10/26/2024  -     Hemoglobin A1C; Future; Expected date: 10/26/2024  -     Lipid Panel with Direct LDL reflex; Future; Expected date: 10/26/2024  -     PSA Total, Diagnostic; Future; Expected date: 10/26/2024  -     TSH, 3rd generation with Free T4 reflex; Future; Expected date: 10/26/2024  -     UA (URINE) with reflex to Scope; Future; Expected date: 10/26/2024  -     Vitamin D 25 hydroxy; Future; Expected date: 10/26/2024  -     Uric acid; Future; Expected date: 10/26/2024    12. Vitamin D deficiency  Assessment & Plan:  Blood work ordered    Orders:  -     CBC; Future; Expected date: 10/26/2024  -     Comprehensive metabolic panel; Future; Expected date: 10/26/2024  -     Hemoglobin A1C; Future; Expected date: 10/26/2024  -     Lipid Panel with Direct LDL reflex; Future; Expected date: 10/26/2024  -     PSA Total, Diagnostic; Future; Expected date: 10/26/2024  -     TSH, 3rd generation with Free T4 reflex; Future; Expected date: 10/26/2024  -     UA (URINE) with reflex to Scope; Future; Expected date: 10/26/2024  -     Vitamin D 25 hydroxy; Future; Expected date: 10/26/2024  -     Uric acid; Future; Expected date: 10/26/2024    13. Primary hypertension  Assessment & Plan:  Stable continue present therapy    Orders:  -     CBC; Future; Expected date: 10/26/2024  -     Comprehensive metabolic panel; Future; Expected date: 10/26/2024  -     Hemoglobin A1C; Future; Expected date: 10/26/2024  -     Lipid Panel with Direct LDL reflex; Future; Expected date: 10/26/2024  -     PSA Total, Diagnostic; Future; Expected date: 10/26/2024  -     TSH, 3rd generation with Free T4 reflex; Future; Expected date: 10/26/2024  -     UA (URINE) with reflex to Scope; Future; Expected date: 10/26/2024  -     Vitamin D 25 hydroxy; Future; Expected date: 10/26/2024  -     Uric acid; Future; Expected date: 10/26/2024    14. Screening for colon  cancer  Assessment & Plan:  Patient to complete Cologuard    Orders:  -     CBC; Future; Expected date: 10/26/2024  -     Comprehensive metabolic panel; Future; Expected date: 10/26/2024  -     Hemoglobin A1C; Future; Expected date: 10/26/2024  -     Lipid Panel with Direct LDL reflex; Future; Expected date: 10/26/2024  -     PSA Total, Diagnostic; Future; Expected date: 10/26/2024  -     TSH, 3rd generation with Free T4 reflex; Future; Expected date: 10/26/2024  -     UA (URINE) with reflex to Scope; Future; Expected date: 10/26/2024  -     Vitamin D 25 hydroxy; Future; Expected date: 10/26/2024  -     Uric acid; Future; Expected date: 10/26/2024    15. Screening for prostate cancer  Assessment & Plan:  PSA ordered    Orders:  -     CBC; Future; Expected date: 10/26/2024  -     Comprehensive metabolic panel; Future; Expected date: 10/26/2024  -     Hemoglobin A1C; Future; Expected date: 10/26/2024  -     Lipid Panel with Direct LDL reflex; Future; Expected date: 10/26/2024  -     PSA Total, Diagnostic; Future; Expected date: 10/26/2024  -     TSH, 3rd generation with Free T4 reflex; Future; Expected date: 10/26/2024  -     UA (URINE) with reflex to Scope; Future; Expected date: 10/26/2024  -     Vitamin D 25 hydroxy; Future; Expected date: 10/26/2024  -     Uric acid; Future; Expected date: 10/26/2024    16. Nocturia  -     CBC; Future; Expected date: 10/26/2024  -     Comprehensive metabolic panel; Future; Expected date: 10/26/2024  -     Hemoglobin A1C; Future; Expected date: 10/26/2024  -     Lipid Panel with Direct LDL reflex; Future; Expected date: 10/26/2024  -     PSA Total, Diagnostic; Future; Expected date: 10/26/2024  -     TSH, 3rd generation with Free T4 reflex; Future; Expected date: 10/26/2024  -     UA (URINE) with reflex to Scope; Future; Expected date: 10/26/2024  -     Vitamin D 25 hydroxy; Future; Expected date: 10/26/2024  -     Uric acid; Future; Expected date: 10/26/2024           Subjective       Patient in for follow-up unfortunately did not complete blood work or 24-hour urine.  Did see cardiology.  Does have evaluation with CT surgery tomorrow.      Review of Systems   Constitutional: Negative.    HENT: Negative.     Eyes: Negative.    Respiratory: Negative.     Cardiovascular:         HPI   Gastrointestinal: Negative.    Endocrine: Negative.    Genitourinary: Negative.    Musculoskeletal: Negative.    Skin: Negative.    Allergic/Immunologic: Negative.    Neurological: Negative.    Hematological: Negative.    Psychiatric/Behavioral: Negative.         Current Outpatient Medications on File Prior to Visit   Medication Sig   • ALPRAZolam (XANAX) 0.5 mg tablet Take 1 tablet (0.5 mg total) by mouth 2 (two) times a day as needed for anxiety   • amLODIPine-valsartan (EXFORGE)  MG per tablet Take 1 tablet by mouth daily   • carvedilol (COREG) 12.5 mg tablet Take 1 tablet (12.5 mg total) by mouth 2 (two) times a day with meals   • escitalopram (LEXAPRO) 20 mg tablet TAKE 1 TABLET BY MOUTH EVERY DAY   • finasteride (PROSCAR) 5 mg tablet take 1 tablet by mouth once daily   • rosuvastatin (CRESTOR) 5 mg tablet TAKE 1 TABLET (5 MG TOTAL) BY MOUTH DAILY.   • SUMAtriptan (Imitrex) 50 mg tablet 1 tablet stat for headache.  May repeat 1 tablet in 2 hours.  No more than 2 to 24 hours   • zolpidem (AMBIEN) 10 mg tablet Take 1 tablet (10 mg total) by mouth daily at bedtime as needed for sleep       Objective     /78   Pulse 78   Temp 97.5 °F (36.4 °C) (Temporal)   Wt 107 kg (235 lb)   SpO2 98%   BMI 31.87 kg/m²     Physical Exam  Vitals and nursing note reviewed.   Constitutional:       Appearance: Normal appearance.   HENT:      Head: Normocephalic and atraumatic.      Mouth/Throat:      Mouth: Mucous membranes are moist.   Eyes:      General: No scleral icterus.  Neck:      Vascular: No carotid bruit.   Cardiovascular:      Rate and Rhythm: Normal rate and regular rhythm.      Heart sounds: Normal  heart sounds.   Pulmonary:      Effort: Pulmonary effort is normal.      Breath sounds: Normal breath sounds.   Abdominal:      General: Bowel sounds are normal.      Palpations: Abdomen is soft.      Tenderness: There is no abdominal tenderness.   Musculoskeletal:      Cervical back: Neck supple.      Right lower leg: No edema.      Left lower leg: No edema.   Skin:     General: Skin is warm and dry.   Neurological:      General: No focal deficit present.      Mental Status: He is alert.   Psychiatric:         Mood and Affect: Mood normal.       Gabriel Hanley DO

## 2024-05-25 PROBLEM — Z12.11 SCREENING FOR COLON CANCER: Status: RESOLVED | Noted: 2023-09-14 | Resolved: 2024-05-25

## 2024-05-25 PROBLEM — Z12.5 SCREENING FOR PROSTATE CANCER: Status: RESOLVED | Noted: 2023-09-14 | Resolved: 2024-05-25

## 2024-06-27 DIAGNOSIS — E55.9 VITAMIN D DEFICIENCY: Primary | ICD-10-CM

## 2024-06-27 RX ORDER — ROSUVASTATIN CALCIUM 10 MG/1
10 TABLET, COATED ORAL DAILY
Qty: 30 TABLET | Refills: 5 | Status: SHIPPED | OUTPATIENT
Start: 2024-06-27

## 2024-07-10 LAB
ALBUMIN SERPL-MCNC: 4.5 G/DL (ref 3.6–5.1)
ALBUMIN/GLOB SERPL: 1.5 (CALC) (ref 1–2.5)
ALP SERPL-CCNC: 151 U/L (ref 35–144)
ALT SERPL-CCNC: 21 U/L (ref 9–46)
AST SERPL-CCNC: 25 U/L (ref 10–35)
BILIRUB SERPL-MCNC: 0.6 MG/DL (ref 0.2–1.2)
BUN SERPL-MCNC: 23 MG/DL (ref 7–25)
BUN/CREAT SERPL: 16 (CALC) (ref 6–22)
CALCIUM SERPL-MCNC: 9.1 MG/DL (ref 8.6–10.3)
CHLORIDE SERPL-SCNC: 106 MMOL/L (ref 98–110)
CO2 SERPL-SCNC: 27 MMOL/L (ref 20–32)
CREAT SERPL-MCNC: 1.47 MG/DL (ref 0.7–1.3)
GFR/BSA.PRED SERPLBLD CYS-BASED-ARV: 57 ML/MIN/1.73M2
GLOBULIN SER CALC-MCNC: 3 G/DL (CALC) (ref 1.9–3.7)
GLUCOSE SERPL-MCNC: 103 MG/DL (ref 65–99)
LDLC SERPL DIRECT ASSAY-MCNC: 105 MG/DL
POTASSIUM SERPL-SCNC: 3.7 MMOL/L (ref 3.5–5.3)
PROT SERPL-MCNC: 7.5 G/DL (ref 6.1–8.1)
SODIUM SERPL-SCNC: 141 MMOL/L (ref 135–146)

## 2024-07-23 DIAGNOSIS — E55.9 VITAMIN D DEFICIENCY: ICD-10-CM

## 2024-07-23 RX ORDER — ROSUVASTATIN CALCIUM 10 MG/1
10 TABLET, COATED ORAL DAILY
Qty: 100 TABLET | Refills: 1 | Status: SHIPPED | OUTPATIENT
Start: 2024-07-23

## 2024-07-30 ENCOUNTER — APPOINTMENT (OUTPATIENT)
Dept: URGENT CARE | Age: 49
End: 2024-07-30

## 2024-08-03 DIAGNOSIS — I10 ACCELERATED HYPERTENSION: ICD-10-CM

## 2024-08-04 RX ORDER — AMLODIPINE AND VALSARTAN 10; 320 MG/1; MG/1
1 TABLET ORAL DAILY
Qty: 90 TABLET | Refills: 1 | Status: SHIPPED | OUTPATIENT
Start: 2024-08-04

## 2024-08-29 ENCOUNTER — TELEPHONE (OUTPATIENT)
Dept: FAMILY MEDICINE CLINIC | Facility: CLINIC | Age: 49
End: 2024-08-29

## 2024-08-29 DIAGNOSIS — R82.5 HIGH CATECHOLAMINES: Primary | ICD-10-CM

## 2024-08-29 NOTE — TELEPHONE ENCOUNTER
----- Message from Gabriel Hanley DO sent at 8/29/2024 10:27 AM EDT -----  24-hour urine metanephrines show mildly elevated level with normetanephrine and metanephrine elevated referring to endocrinology for evaluation.  CT of adrenal glands ordered

## 2024-08-31 LAB
5OH-INDOLEACETATE 24H UR-MRATE: 3.8 MG/24 H
DOPAMINE 24H UR-MRATE: 222 MCG/24 H (ref 52–480)
EPINEPH 24H UR-MRATE: ABNORMAL
EPINEPH+NOR 24H UR-MRATE: 136 MCG/24 H (ref 26–121)
METANEPH 24H UR-MRATE: 127 MCG/24 H (ref 90–315)
METANEPHS 24H UR-MRATE: 1107 MCG/24 H (ref 224–832)
NOREPINEPH 24H UR-MRATE: 136 MCG/24 H (ref 15–100)
NORMETANEPHRINE 24H UR-MRATE: 980 MCG/24 H (ref 122–676)
SPECIMEN VOL 24H UR: 3000 ML
SPECIMEN VOL 24H UR: 3000 ML/24 H
VMA 24H UR-MRATE: 3.7 MG/24 H

## 2024-09-04 DIAGNOSIS — E78.2 MIXED HYPERLIPIDEMIA: ICD-10-CM

## 2024-09-04 RX ORDER — ROSUVASTATIN CALCIUM 5 MG/1
5 TABLET, COATED ORAL DAILY
Qty: 90 TABLET | Refills: 1 | Status: SHIPPED | OUTPATIENT
Start: 2024-09-04

## 2024-09-05 ENCOUNTER — TELEPHONE (OUTPATIENT)
Dept: FAMILY MEDICINE CLINIC | Facility: CLINIC | Age: 49
End: 2024-09-05

## 2024-10-15 DIAGNOSIS — F33.9 DEPRESSION, RECURRENT (HCC): ICD-10-CM

## 2024-10-16 RX ORDER — ESCITALOPRAM OXALATE 20 MG/1
20 TABLET ORAL DAILY
Qty: 90 TABLET | Refills: 1 | Status: SHIPPED | OUTPATIENT
Start: 2024-10-16

## 2024-11-14 DIAGNOSIS — I77.810 ASCENDING AORTA DILATION (HCC): ICD-10-CM

## 2024-11-14 DIAGNOSIS — I10 ESSENTIAL HYPERTENSION: ICD-10-CM

## 2024-11-14 DIAGNOSIS — I10 ACCELERATED HYPERTENSION: ICD-10-CM

## 2024-11-14 DIAGNOSIS — F33.9 DEPRESSION, RECURRENT (HCC): ICD-10-CM

## 2024-11-14 RX ORDER — CARVEDILOL 12.5 MG/1
12.5 TABLET ORAL 2 TIMES DAILY WITH MEALS
Qty: 180 TABLET | Refills: 3 | Status: SHIPPED | OUTPATIENT
Start: 2024-11-14 | End: 2025-02-12

## 2024-11-14 RX ORDER — AMLODIPINE AND VALSARTAN 10; 320 MG/1; MG/1
1 TABLET ORAL DAILY
Qty: 90 TABLET | Refills: 0 | Status: SHIPPED | OUTPATIENT
Start: 2024-11-14

## 2024-11-14 RX ORDER — ESCITALOPRAM OXALATE 20 MG/1
20 TABLET ORAL DAILY
Qty: 90 TABLET | Refills: 0 | Status: SHIPPED | OUTPATIENT
Start: 2024-11-14

## 2024-11-14 NOTE — TELEPHONE ENCOUNTER
Prescription renewed please schedule patient for follow-up office visit in the next few weeks.  He should complete blood work that was ordered back in April prior to his office visit

## 2024-11-14 NOTE — TELEPHONE ENCOUNTER
Requested Prescriptions     Pending Prescriptions Disp Refills    amLODIPine-valsartan (EXFORGE)  MG per tablet 90 tablet 0     Sig: Take 1 tablet by mouth daily    escitalopram (LEXAPRO) 20 mg tablet 90 tablet 0     Sig: Take 1 tablet (20 mg total) by mouth daily      LOV 4/25/24 F/U non scheduled, labs completed

## 2025-02-10 DIAGNOSIS — E55.9 VITAMIN D DEFICIENCY: ICD-10-CM

## 2025-02-11 RX ORDER — ROSUVASTATIN CALCIUM 10 MG/1
10 TABLET, COATED ORAL DAILY
Qty: 110 TABLET | Refills: 0 | OUTPATIENT
Start: 2025-02-11

## 2025-02-15 DIAGNOSIS — E55.9 VITAMIN D DEFICIENCY: ICD-10-CM

## 2025-02-17 RX ORDER — ROSUVASTATIN CALCIUM 10 MG/1
10 TABLET, COATED ORAL DAILY
Qty: 110 TABLET | Refills: 0 | OUTPATIENT
Start: 2025-02-17

## 2025-02-20 DIAGNOSIS — E55.9 VITAMIN D DEFICIENCY: ICD-10-CM

## 2025-02-20 RX ORDER — ROSUVASTATIN CALCIUM 10 MG/1
10 TABLET, COATED ORAL DAILY
Qty: 110 TABLET | Refills: 0 | OUTPATIENT
Start: 2025-02-20

## 2025-05-03 DIAGNOSIS — I10 ACCELERATED HYPERTENSION: ICD-10-CM

## 2025-05-06 RX ORDER — AMLODIPINE AND VALSARTAN 10; 320 MG/1; MG/1
1 TABLET ORAL DAILY
Qty: 90 TABLET | Refills: 0 | OUTPATIENT
Start: 2025-05-06

## 2025-05-06 NOTE — TELEPHONE ENCOUNTER
Requested Prescriptions     Pending Prescriptions Disp Refills    amLODIPine-valsartan (EXFORGE)  MG per tablet [Pharmacy Med Name: AMLODIPINE-VALSARTAN  MG] 90 tablet 0     Sig: TAKE 1 TABLET BY MOUTH EVERY DAY

## 2025-05-08 ENCOUNTER — TELEPHONE (OUTPATIENT)
Age: 50
End: 2025-05-08

## 2025-05-08 NOTE — TELEPHONE ENCOUNTER
Patients significant other called and stated that patient had a cologuard sent to him however he is unable to complete the test as it has . Patients significant other is asking if a new kit can be sent to them. Please advise and return patient call with any questions or concerns.

## 2025-05-08 NOTE — TELEPHONE ENCOUNTER
Per medical consent ,Left detailed mess with providers response   Yes-Patient/Caregiver accepts free interpretation services...

## 2025-05-08 NOTE — TELEPHONE ENCOUNTER
Patient needs office visit.  Patient was seen over a year ago did not make his 6-month follow-up visit in addition patient has not completed CT of adrenal glands or consultation with endocrinology

## 2025-05-20 DIAGNOSIS — I10 ACCELERATED HYPERTENSION: ICD-10-CM

## 2025-05-20 RX ORDER — AMLODIPINE AND VALSARTAN 10; 320 MG/1; MG/1
1 TABLET ORAL DAILY
Qty: 90 TABLET | Refills: 0 | OUTPATIENT
Start: 2025-05-20

## 2025-05-29 DIAGNOSIS — I10 ACCELERATED HYPERTENSION: ICD-10-CM

## 2025-05-29 RX ORDER — AMLODIPINE AND VALSARTAN 10; 320 MG/1; MG/1
1 TABLET ORAL DAILY
Qty: 90 TABLET | Refills: 0 | Status: SHIPPED | OUTPATIENT
Start: 2025-05-29 | End: 2025-06-05 | Stop reason: SDUPTHER

## 2025-05-29 NOTE — TELEPHONE ENCOUNTER
Medication: amLODIPine-valsartan (EXFORGE)  MG per tablet     Dose/Frequency: Take 1 tablet by mouth daily     Quantity: 90    Pharmacy: [id number] - [place], [place] - [address] [facility]     Office:   [x] PCP/Provider -   [] Speciality/Provider -     Does the patient have enough for 3 days?   [] Yes   [x] No - Send as HP to POD

## 2025-05-30 RX ORDER — AMLODIPINE AND VALSARTAN 10; 320 MG/1; MG/1
1 TABLET ORAL DAILY
Qty: 90 TABLET | Refills: 0 | OUTPATIENT
Start: 2025-05-30

## 2025-06-05 ENCOUNTER — OFFICE VISIT (OUTPATIENT)
Dept: FAMILY MEDICINE CLINIC | Facility: CLINIC | Age: 50
End: 2025-06-05
Payer: COMMERCIAL

## 2025-06-05 VITALS
WEIGHT: 246 LBS | OXYGEN SATURATION: 97 % | HEART RATE: 97 BPM | DIASTOLIC BLOOD PRESSURE: 80 MMHG | SYSTOLIC BLOOD PRESSURE: 128 MMHG | HEIGHT: 72 IN | BODY MASS INDEX: 33.32 KG/M2

## 2025-06-05 DIAGNOSIS — B00.9 HSV-2 (HERPES SIMPLEX VIRUS 2) INFECTION: ICD-10-CM

## 2025-06-05 DIAGNOSIS — F41.9 ANXIETY: ICD-10-CM

## 2025-06-05 DIAGNOSIS — R91.1 PULMONARY NODULE: ICD-10-CM

## 2025-06-05 DIAGNOSIS — M10.9 GOUTY ARTHRITIS: ICD-10-CM

## 2025-06-05 DIAGNOSIS — F32.2 SEVERE MAJOR DEPRESSIVE DISORDER (HCC): ICD-10-CM

## 2025-06-05 DIAGNOSIS — G47.00 INSOMNIA, UNSPECIFIED TYPE: ICD-10-CM

## 2025-06-05 DIAGNOSIS — F33.9 DEPRESSION, RECURRENT (HCC): ICD-10-CM

## 2025-06-05 DIAGNOSIS — G43.009 MIGRAINE WITHOUT AURA AND WITHOUT STATUS MIGRAINOSUS, NOT INTRACTABLE: ICD-10-CM

## 2025-06-05 DIAGNOSIS — Z12.5 SCREENING FOR PROSTATE CANCER: ICD-10-CM

## 2025-06-05 DIAGNOSIS — I77.810 ASCENDING AORTA DILATION (HCC): ICD-10-CM

## 2025-06-05 DIAGNOSIS — I10 PRIMARY HYPERTENSION: ICD-10-CM

## 2025-06-05 DIAGNOSIS — E78.2 MIXED HYPERLIPIDEMIA: ICD-10-CM

## 2025-06-05 DIAGNOSIS — R73.9 HYPERGLYCEMIA: ICD-10-CM

## 2025-06-05 DIAGNOSIS — I10 ACCELERATED HYPERTENSION: ICD-10-CM

## 2025-06-05 DIAGNOSIS — Z00.00 ANNUAL PHYSICAL EXAM: Primary | ICD-10-CM

## 2025-06-05 DIAGNOSIS — R82.5 HIGH CATECHOLAMINES: ICD-10-CM

## 2025-06-05 DIAGNOSIS — E55.9 VITAMIN D DEFICIENCY: ICD-10-CM

## 2025-06-05 DIAGNOSIS — Z12.11 SCREENING FOR COLON CANCER: ICD-10-CM

## 2025-06-05 DIAGNOSIS — L64.9 MALE PATTERN BALDNESS: ICD-10-CM

## 2025-06-05 PROCEDURE — 99214 OFFICE O/P EST MOD 30 MIN: CPT | Performed by: FAMILY MEDICINE

## 2025-06-05 PROCEDURE — 99396 PREV VISIT EST AGE 40-64: CPT | Performed by: FAMILY MEDICINE

## 2025-06-05 RX ORDER — ESCITALOPRAM OXALATE 20 MG/1
20 TABLET ORAL DAILY
Qty: 90 TABLET | Refills: 0 | Status: SHIPPED | OUTPATIENT
Start: 2025-06-05

## 2025-06-05 RX ORDER — VALACYCLOVIR HYDROCHLORIDE 500 MG/1
500 TABLET, FILM COATED ORAL 2 TIMES DAILY
Qty: 14 TABLET | Refills: 2 | Status: SHIPPED | OUTPATIENT
Start: 2025-06-05 | End: 2025-06-12

## 2025-06-05 RX ORDER — ROSUVASTATIN CALCIUM 10 MG/1
10 TABLET, COATED ORAL DAILY
Qty: 100 TABLET | Refills: 1 | Status: SHIPPED | OUTPATIENT
Start: 2025-06-05

## 2025-06-05 RX ORDER — FINASTERIDE 5 MG/1
5 TABLET, FILM COATED ORAL DAILY
Qty: 30 TABLET | Refills: 3 | Status: SHIPPED | OUTPATIENT
Start: 2025-06-05

## 2025-06-05 RX ORDER — ZOLPIDEM TARTRATE 10 MG/1
10 TABLET ORAL
Qty: 30 TABLET | Refills: 2 | Status: SHIPPED | OUTPATIENT
Start: 2025-06-05

## 2025-06-05 RX ORDER — SUMATRIPTAN 50 MG/1
TABLET, FILM COATED ORAL
Qty: 9 TABLET | Refills: 3 | Status: SHIPPED | OUTPATIENT
Start: 2025-06-05

## 2025-06-05 RX ORDER — AMLODIPINE AND VALSARTAN 10; 320 MG/1; MG/1
1 TABLET ORAL DAILY
Qty: 90 TABLET | Refills: 0 | Status: SHIPPED | OUTPATIENT
Start: 2025-06-05

## 2025-06-05 RX ORDER — ALPRAZOLAM 0.5 MG
0.5 TABLET ORAL 2 TIMES DAILY PRN
Qty: 30 TABLET | Refills: 2 | Status: SHIPPED | OUTPATIENT
Start: 2025-06-05

## 2025-06-05 NOTE — PROGRESS NOTES
Adult Annual Physical  Name: Chente Mesa Jr.      : 1975      MRN: [id number]  Encounter Provider: Gabriel Hanley DO  Encounter Date: 2025   Encounter department: [facility] PRIMARY [facility]ARE  Recheck 2 months sooner if needed    :  Assessment & Plan  Annual physical exam  Completed today.  Patient defers Adacel and Shingrix at this time       Ascending aorta dilation (HCC)  CT ordered, CT surgery consultation ordered  Orders:  •  CT chest wo contrast; Future  •  Ambulatory Referral to Cardiovascular Surgery; Future    Pulmonary nodule  CT ordered  Orders:  •  CT chest wo contrast; Future    High catecholamines  24-hour urine is ordered  Endocrinology consultation is ordered  Orders:  •  Catecholamines, fractionated, urine, 24 hour; Future  •  Metanephrines Fractionated, urine, 24 hour; Future    Primary hypertension  Stable, patient to complete 24-hour urines  Orders:  •  Catecholamines, fractionated, urine, 24 hour; Future  •  Metanephrines Fractionated, urine, 24 hour; Future    Gouty arthritis  Uric acid is ordered  Orders:  •  CBC; Future  •  Comprehensive metabolic panel; Future  •  Hemoglobin A1C; Future  •  Lipid Panel with Direct LDL reflex; Future  •  TSH, 3rd generation with Free T4 reflex; Future  •  PSA, Total Screen; Future  •  UA (URINE) with reflex to Scope; Future  •  Vitamin D 25 hydroxy; Future  •  Uric acid; Future    Hyperglycemia  Blood work is ordered  Orders:  •  CBC; Future  •  Comprehensive metabolic panel; Future  •  Hemoglobin A1C; Future  •  Lipid Panel with Direct LDL reflex; Future  •  TSH, 3rd generation with Free T4 reflex; Future  •  PSA, Total Screen; Future  •  UA (URINE) with reflex to Scope; Future  •  Vitamin D 25 hydroxy; Future  •  Uric acid; Future    Mixed hyperlipidemia  Patient to take Crestor 10 mg daily  Orders:  •  CBC; Future  •  Comprehensive metabolic panel; Future  •  Hemoglobin A1C; Future  •  Lipid Panel with Direct LDL reflex; Future  •   TSH, 3rd generation with Free T4 reflex; Future  •  PSA, Total Screen; Future  •  UA (URINE) with reflex to Scope; Future  •  Vitamin D 25 hydroxy; Future  •  Uric acid; Future    Vitamin D deficiency  Blood work is ordered  Orders:  •  CBC; Future  •  Comprehensive metabolic panel; Future  •  Hemoglobin A1C; Future  •  Lipid Panel with Direct LDL reflex; Future  •  TSH, 3rd generation with Free T4 reflex; Future  •  PSA, Total Screen; Future  •  UA (URINE) with reflex to Scope; Future  •  Vitamin D 25 hydroxy; Future  •  Uric acid; Future  •  rosuvastatin (CRESTOR) 10 MG tablet; Take 1 tablet (10 mg total) by mouth daily    Screening for prostate cancer  PSA is ordered  Orders:  •  CBC; Future  •  Comprehensive metabolic panel; Future  •  Hemoglobin A1C; Future  •  Lipid Panel with Direct LDL reflex; Future  •  TSH, 3rd generation with Free T4 reflex; Future  •  PSA, Total Screen; Future  •  UA (URINE) with reflex to Scope; Future  •  Vitamin D 25 hydroxy; Future  •  Uric acid; Future    Anxiety  Stable PDMP reviewed Xanax was reordered  Orders:  •  ALPRAZolam (XANAX) 0.5 mg tablet; Take 1 tablet (0.5 mg total) by mouth 2 (two) times a day as needed for anxiety    Accelerated hypertension  Stable continue present therapy  Orders:  •  amLODIPine-valsartan (EXFORGE)  MG per tablet; Take 1 tablet by mouth daily    Depression, recurrent (HCC)  Depression Screening Follow-up Plan: Patient's depression screening was positive with a PHQ-9 score of 5. Patient advised to follow-up with PCP for further management.  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  May repeat 1 tablet in 2 hours.  No more than 2 to 24 hours    Insomnia, unspecified type  DMP reviewed Ambien was reordered  Orders:  •  zolpidem (AMBIEN) 10 mg tablet; Take 1 tablet (10 mg total) by mouth daily at bedtime as needed for sleep    Severe major depressive disorder (HCC)  Depression Screening Follow-up Plan: Patient's depression screening was positive with a PHQ-9 score of 5. Patient advised to follow-up with PCP for further management.  Lexapro reordered will monitor       Screening for colon cancer    Orders:  •  Cologuard    HSV-2 (herpes simplex virus 2) infection  Valtrex was reordered  Orders:  •  valACYclovir (VALTREX) 500 mg tablet; Take 1 tablet (500 mg total) by mouth 2 (two) times a day for 7 days        Preventive Screenings:  - Diabetes Screening: screening up-to-date and orders placed  - Cholesterol Screening: screening not indicated, has hyperlipidemia and orders placed   - Hepatitis C screening: screening up-to-date   - HIV screening: screening up-to-date   - Lung cancer screening: screening not indicated   - Prostate cancer screening: risks/benefits discussed and orders placed     Immunizations:  - Immunizations due: Tdap and Zoster (Shingrix)  - Risks/benefits immunizations discussed    - The patient declines recommended vaccines currently despite my recommendations      Counseling/Anticipatory Guidance:    - Diet: discussed recommendations for a healthy/well-balanced diet.   - Exercise: the importance of regular exercise/physical activity was discussed. Recommend exercise 3-5 times per week for at least 30 minutes.          History of Present Illness     Adult Annual Physical:  Patient presents for annual physical.     Diet and Physical Activity:  - Diet/Nutrition: no special diet.  - Exercise: no formal exercise.    Depression Screening:    - PHQ-9 Score: 5    General Health:  - Sleep: sleeps poorly.  - Hearing: normal hearing right ear and  normal hearing left ear.  - Vision: most recent eye exam > 1 year ago and wears glasses.  - Dental: regular dental visits.    /GYN Health:  - Follows with GYN: no.   - History of STDs: yes     Health:  - History of STDs: yes.   - Urinary symptoms: none.     Advanced Care Planning:  - Has an advanced directive?: no    - Has a durable medical POA?: no    - ACP document given to patient?: yes      Review of Systems   Constitutional: Negative.    HENT: Negative.     Eyes: Negative.    Respiratory: Negative.     Cardiovascular: Negative.    Gastrointestinal: Negative.    Endocrine: Negative.    Genitourinary:         Patient is an outbreak of genital herpes.  He has not had Valtrex in quite some time requests him to keep on hand   Musculoskeletal: Negative.    Skin: Negative.    Allergic/Immunologic: Negative.    Neurological: Negative.    Hematological: Negative.    Psychiatric/Behavioral: Negative.           Objective   /80   Pulse 97   Ht 6' (1.829 m)   Wt 112 kg (246 lb)   SpO2 97%   BMI 33.36 kg/m²     Physical Exam  Vitals and nursing note reviewed.   Constitutional:       Appearance: Normal appearance.   HENT:      Head: Normocephalic and atraumatic.      Right Ear: Tympanic membrane normal.      Left Ear: Tympanic membrane normal.      Nose: Nose normal.      Mouth/Throat:      Mouth: Mucous membranes are moist.      Pharynx: Oropharynx is clear. No oropharyngeal exudate or posterior oropharyngeal erythema.     Eyes:      General: No scleral icterus.    Neck:      Vascular: No carotid bruit.     Cardiovascular:      Rate and Rhythm: Normal rate and regular rhythm.      Heart sounds: Normal heart sounds.   Pulmonary:      Effort: Pulmonary effort is normal.      Breath sounds: Normal breath sounds.   Abdominal:      Palpations: Abdomen is soft.      Tenderness: There is no abdominal tenderness.     Musculoskeletal:      Cervical back: Neck supple.      Right lower leg: No edema.      Left lower leg:  No edema.     Skin:     General: Skin is warm and dry.     Neurological:      General: No focal deficit present.      Mental Status: He is alert and oriented to person, place, and time.      Cranial Nerves: No cranial nerve deficit.     Psychiatric:         Mood and Affect: Mood normal.           PHQ-9 Depression Screening    Little interest or pleasure in doing things: 1 - several days  Feeling down, depressed, or hopeless: 1 - several days  Trouble falling or staying asleep, or sleeping too much: 0 - not at all  Feeling tired or having little energy: 1 - several days  Poor appetite or overeatin - not at all  Feeling bad about yourself - or that you are a failure or have let yourself or your family down: 1 - several days  Trouble concentrating on things, such as reading the newspaper or watching television: 1 - several days  Moving or speaking so slowly that other people could have noticed. Or the opposite - being so fidgety or restless that you have been moving around a lot more than usual: 0 - not at all  Thoughts that you would be better off dead, or of hurting yourself in some way: 0 - not at all  PHQ-9 Score: 5  Score Interpretation: Mild depression

## 2025-06-05 NOTE — ASSESSMENT & PLAN NOTE
Depression Screening Follow-up Plan: Patient's depression screening was positive with a PHQ-9 score of 5. Patient advised to follow-up with PCP for further management.  Lexapro 20 mg was ordered will monitor  Orders:  •  escitalopram (LEXAPRO) 20 mg tablet; Take 1 tablet (20 mg total) by mouth daily

## 2025-06-05 NOTE — ASSESSMENT & PLAN NOTE
Blood work is ordered  Orders:  •  CBC; Future  •  Comprehensive metabolic panel; Future  •  Hemoglobin A1C; Future  •  Lipid Panel with Direct LDL reflex; Future  •  TSH, 3rd generation with Free T4 reflex; Future  •  PSA, Total Screen; Future  •  UA (URINE) with reflex to Scope; Future  •  Vitamin D 25 hydroxy; Future  •  Uric acid; Future  •  rosuvastatin (CRESTOR) 10 MG tablet; Take 1 tablet (10 mg total) by mouth daily

## 2025-06-05 NOTE — ASSESSMENT & PLAN NOTE
Blood work is ordered  Orders:  •  CBC; Future  •  Comprehensive metabolic panel; Future  •  Hemoglobin A1C; Future  •  Lipid Panel with Direct LDL reflex; Future  •  TSH, 3rd generation with Free T4 reflex; Future  •  PSA, Total Screen; Future  •  UA (URINE) with reflex to Scope; Future  •  Vitamin D 25 hydroxy; Future  •  Uric acid; Future

## 2025-06-05 NOTE — ASSESSMENT & PLAN NOTE
Valtrex was reordered  Orders:  •  valACYclovir (VALTREX) 500 mg tablet; Take 1 tablet (500 mg total) by mouth 2 (two) times a day for 7 days

## 2025-06-05 NOTE — ASSESSMENT & PLAN NOTE
Stable, patient to complete 24-hour urines  Orders:  •  Catecholamines, fractionated, urine, 24 hour; Future  •  Metanephrines Fractionated, urine, 24 hour; Future

## 2025-06-05 NOTE — ASSESSMENT & PLAN NOTE
DMP reviewed Ambien was reordered  Orders:  •  zolpidem (AMBIEN) 10 mg tablet; Take 1 tablet (10 mg total) by mouth daily at bedtime as needed for sleep

## 2025-06-05 NOTE — ASSESSMENT & PLAN NOTE
Finasteride reordered  Orders:  •  finasteride (PROSCAR) 5 mg tablet; Take 1 tablet (5 mg total) by mouth daily

## 2025-06-05 NOTE — ASSESSMENT & PLAN NOTE
Uric acid is ordered  Orders:  •  CBC; Future  •  Comprehensive metabolic panel; Future  •  Hemoglobin A1C; Future  •  Lipid Panel with Direct LDL reflex; Future  •  TSH, 3rd generation with Free T4 reflex; Future  •  PSA, Total Screen; Future  •  UA (URINE) with reflex to Scope; Future  •  Vitamin D 25 hydroxy; Future  •  Uric acid; Future

## 2025-06-05 NOTE — ASSESSMENT & PLAN NOTE
Stable Imitrex reordered  Orders:  •  SUMAtriptan (Imitrex) 50 mg tablet; 1 tablet stat for headache.  May repeat 1 tablet in 2 hours.  No more than 2 to 24 hours

## 2025-06-05 NOTE — ASSESSMENT & PLAN NOTE
24-hour urine is ordered  Endocrinology consultation is ordered  Orders:  •  Catecholamines, fractionated, urine, 24 hour; Future  •  Metanephrines Fractionated, urine, 24 hour; Future

## 2025-06-05 NOTE — ASSESSMENT & PLAN NOTE
Stable PDMP reviewed Xanax was reordered  Orders:  •  ALPRAZolam (XANAX) 0.5 mg tablet; Take 1 tablet (0.5 mg total) by mouth 2 (two) times a day as needed for anxiety

## 2025-06-05 NOTE — ASSESSMENT & PLAN NOTE
Stable continue present therapy  Orders:  •  amLODIPine-valsartan (EXFORGE)  MG per tablet; Take 1 tablet by mouth daily

## 2025-06-05 NOTE — ASSESSMENT & PLAN NOTE
Patient to take Crestor 10 mg daily  Orders:  •  CBC; Future  •  Comprehensive metabolic panel; Future  •  Hemoglobin A1C; Future  •  Lipid Panel with Direct LDL reflex; Future  •  TSH, 3rd generation with Free T4 reflex; Future  •  PSA, Total Screen; Future  •  UA (URINE) with reflex to Scope; Future  •  Vitamin D 25 hydroxy; Future  •  Uric acid; Future

## 2025-06-05 NOTE — PATIENT INSTRUCTIONS
"Complete blood work as ordered  Complete 24-hour urine as ordered  Complete CT of chest  Follow-up with cardiothoracic surgery for evaluation of ascending aortic dilation  Complete Cologuard for colon cancer screening  Return in 2 months sooner if needed          Patient Education     Routine physical for adults   The Basics   Written by the doctors and editors at [facility]   What is a physical? -- A physical is a routine visit, or \"check-up,\" with your doctor. You might also hear it called a \"wellness visit\" or \"preventive visit.\"  During each visit, the doctor will:   Ask about your physical and mental health   Ask about your habits, behaviors, and lifestyle   Do an exam   Give you vaccines if needed   Talk to you about any medicines you take   Give advice about your health   Answer your questions  Getting regular check-ups is an important part of taking care of your health. It can help your doctor find and treat any problems you have. But it's also important for preventing health problems.  A routine physical is different from a \"sick visit.\" A sick visit is when you see a doctor because of a health concern or problem. Since physicals are scheduled ahead of time, you can think about what you want to ask the doctor.  How often should I get a physical? -- It depends on your age and health. In general, for people age 21 years and older:   If you are younger than 50 years, you might be able to get a physical every 3 years.   If you are 50 years or older, your doctor might recommend a physical every year.  If you have an ongoing health condition, like diabetes or high blood pressure, your doctor will probably want to see you more often.  What happens during a physical? -- In general, each visit will include:   Physical exam - The doctor or nurse will check your height, weight, heart rate, and blood pressure. They will also look at your eyes and ears. They will ask about how you are feeling and whether you have any " "symptoms that bother you.   Medicines - It's a good idea to bring a list of all the medicines you take to each doctor visit. Your doctor will talk to you about your medicines and answer any questions. Tell them if you are having any side effects that bother you. You should also tell them if you are having trouble paying for any of your medicines.   Habits and behaviors - This includes:   Your diet   Your exercise habits   Whether you smoke, drink alcohol, or use drugs   Whether you are sexually active   Whether you feel safe at home  Your doctor will talk to you about things you can do to improve your health and lower your risk of health problems. They will also offer help and support. For example, if you want to quit smoking, they can give you advice and might prescribe medicines. If you want to improve your diet or get more physical activity, they can help you with this, too.   Lab tests, if needed - The tests you get will depend on your age and situation. For example, your doctor might want to check your:   Cholesterol   Blood sugar   Iron level   Vaccines - The recommended vaccines will depend on your age, health, and what vaccines you already had. Vaccines are very important because they can prevent certain serious or deadly infections.   Discussion of screening - \"Screening\" means checking for diseases or other health problems before they cause symptoms. Your doctor can recommend screening based on your age, risk, and preferences. This might include tests to check for:   Cancer, such as breast, prostate, cervical, ovarian, colorectal, prostate, lung, or skin cancer   Sexually transmitted infections, such as chlamydia and gonorrhea   Mental health conditions like depression and anxiety  Your doctor will talk to you about the different types of screening tests. They can help you decide which screenings to have. They can also explain what the results might mean.   Answering questions - The physical is a good time " to ask the doctor or nurse questions about your health. If needed, they can refer you to other doctors or specialists, too.  Adults older than 65 years often need other care, too. As you get older, your doctor will talk to you about:   How to prevent falling at home   Hearing or vision tests   Memory testing   How to take your medicines safely   Making sure that you have the help and support you need at home  All topics are updated as new evidence becomes available and our peer review process is complete.  This topic retrieved from [employer] on: May 02, 2024.  Topic 860560 Version 1.0  Release: 32.4.3 - C32.122  © 2024 Up[employer], Inc. and/or its affiliates. All rights reserved.  Consumer Information Use and Disclaimer   Disclaimer: This generalized information is a limited summary of diagnosis, treatment, and/or medication information. It is not meant to be comprehensive and should be used as a tool to help the user understand and/or assess potential diagnostic and treatment options. It does NOT include all information about conditions, treatments, medications, side effects, or risks that may apply to a specific patient. It is not intended to be medical advice or a substitute for the medical advice, diagnosis, or treatment of a health care provider based on the health care provider's examination and assessment of a patient's specific and unique circumstances. Patients must speak with a health care provider for complete information about their health, medical questions, and treatment options, including any risks or benefits regarding use of medications. This information does not endorse any treatments or medications as safe, effective, or approved for treating a specific patient. Up[employer], Inc. and its affiliates disclaim any warranty or liability relating to this information or the use thereof.The use of this information is governed by the Terms of Use, available at  https://www.woltersFIRE1uwer.com/en/know/clinical-effectiveness-terms. 2024© [employer] and its affiliates and/or licensors. All rights reserved.  Copyright   © 2024 [employer] and/or its affiliates. All rights reserved.

## 2025-06-05 NOTE — ASSESSMENT & PLAN NOTE
CT ordered, CT surgery consultation ordered  Orders:  •  CT chest wo contrast; Future  •  Ambulatory Referral to Cardiovascular Surgery; Future

## 2025-06-05 NOTE — ASSESSMENT & PLAN NOTE
Depression Screening Follow-up Plan: Patient's depression screening was positive with a PHQ-9 score of 5. Patient advised to follow-up with PCP for further management.  Lexapro readened will monitor

## 2025-06-10 ENCOUNTER — APPOINTMENT (OUTPATIENT)
Dept: URGENT CARE | Age: 50
End: 2025-06-10

## 2025-06-25 LAB — COLOGUARD RESULT REPORTABLE: NORMAL

## 2025-07-10 DIAGNOSIS — L64.9 MALE PATTERN BALDNESS: ICD-10-CM

## 2025-07-11 RX ORDER — FINASTERIDE 5 MG/1
5 TABLET, FILM COATED ORAL DAILY
Qty: 90 TABLET | Refills: 2 | Status: SHIPPED | OUTPATIENT
Start: 2025-07-11

## 2025-07-11 NOTE — TELEPHONE ENCOUNTER
Requested Prescriptions     Pending Prescriptions Disp Refills    finasteride (PROSCAR) 5 mg tablet [Pharmacy Med Name: FINASTERIDE 5 MG TABLET] 90 tablet 2     Sig: TAKE 1 TABLET (5 MG TOTAL) BY MOUTH DAILY.

## 2025-08-05 LAB — COLOGUARD RESULT REPORTABLE: NEGATIVE
